# Patient Record
Sex: FEMALE | Race: WHITE | Employment: PART TIME | URBAN - METROPOLITAN AREA
[De-identification: names, ages, dates, MRNs, and addresses within clinical notes are randomized per-mention and may not be internally consistent; named-entity substitution may affect disease eponyms.]

---

## 2017-03-29 DIAGNOSIS — F32.81 PMDD (PREMENSTRUAL DYSPHORIC DISORDER): ICD-10-CM

## 2017-03-29 DIAGNOSIS — F41.9 ANXIETY: ICD-10-CM

## 2017-03-29 DIAGNOSIS — Z51.81 ENCOUNTER FOR THERAPEUTIC DRUG MONITORING: Primary | ICD-10-CM

## 2017-03-29 RX ORDER — FLUOXETINE HYDROCHLORIDE 40 MG/1
CAPSULE ORAL
Qty: 90 CAPSULE | Refills: 0 | Status: SHIPPED | OUTPATIENT
Start: 2017-03-29 | End: 2017-12-20

## 2017-03-29 NOTE — TELEPHONE ENCOUNTER
Patient is requesting a refill on Fluoxetine 40mg # 90  LOV- 7/28/16  Last Refill- 12/31/16  Needs appointment to address anxiety

## 2017-06-27 DIAGNOSIS — Z51.81 ENCOUNTER FOR THERAPEUTIC DRUG MONITORING: ICD-10-CM

## 2017-06-27 DIAGNOSIS — F41.9 ANXIETY: ICD-10-CM

## 2017-06-27 DIAGNOSIS — F32.81 PMDD (PREMENSTRUAL DYSPHORIC DISORDER): ICD-10-CM

## 2017-06-27 NOTE — TELEPHONE ENCOUNTER
Requesting FLUOXETINE HCL CAPS 40MG    LOV: 11/18/2016  Filled: 3.29.2017 #90 with 0 refills    No future appointments. Please call pt and schedule a medication refill OV. Medication placed in pending until appt is scheduled.

## 2017-06-29 RX ORDER — FLUOXETINE HYDROCHLORIDE 40 MG/1
CAPSULE ORAL
Qty: 90 CAPSULE | OUTPATIENT
Start: 2017-06-29

## 2017-06-30 ENCOUNTER — TELEPHONE (OUTPATIENT)
Dept: FAMILY MEDICINE CLINIC | Facility: CLINIC | Age: 47
End: 2017-06-30

## 2017-06-30 DIAGNOSIS — E78.2 ELEVATED TRIGLYCERIDES WITH HIGH CHOLESTEROL: ICD-10-CM

## 2017-06-30 DIAGNOSIS — Z13.29 SCREENING FOR THYROID DISORDER: ICD-10-CM

## 2017-06-30 DIAGNOSIS — I10 ESSENTIAL HYPERTENSION: Primary | ICD-10-CM

## 2017-06-30 NOTE — TELEPHONE ENCOUNTER
Spoke to pt and made her aware that orders are in the system for [facility] and they are fasting.  Pt made follow up for labs and meds

## 2017-07-14 LAB
ABSOLUTE BASOPHILS: 12 CELLS/UL (ref 0–200)
ABSOLUTE EOSINOPHILS: 120 CELLS/UL (ref 15–500)
ABSOLUTE LYMPHOCYTES: 1074 CELLS/UL (ref 850–3900)
ABSOLUTE MONOCYTES: 450 CELLS/UL (ref 200–950)
ABSOLUTE NEUTROPHILS: 4344 CELLS/UL (ref 1500–7800)
ALBUMIN/GLOBULIN RATIO: 1.4 (CALC) (ref 1–2.5)
ALBUMIN: 4.1 G/DL (ref 3.6–5.1)
ALKALINE PHOSPHATASE: 54 U/L (ref 33–115)
ALT: 16 U/L (ref 6–29)
AST: 21 U/L (ref 10–35)
BASOPHILS: 0.2 %
BILIRUBIN, TOTAL: 0.6 MG/DL (ref 0.2–1.2)
BUN: 13 MG/DL (ref 7–25)
CALCIUM: 9.2 MG/DL (ref 8.6–10.2)
CARBON DIOXIDE: 25 MMOL/L (ref 20–31)
CHLORIDE: 104 MMOL/L (ref 98–110)
CHOLESTEROL, TOTAL: 180 MG/DL (ref 125–200)
CREATININE: 0.9 MG/DL (ref 0.5–1.1)
EGFR IF AFRICN AM: 89 ML/MIN/1.73M2
EGFR IF NONAFRICN AM: 77 ML/MIN/1.73M2
EOSINOPHILS: 2 %
GLOBULIN: 2.9 G/DL (CALC) (ref 1.9–3.7)
GLUCOSE: 95 MG/DL (ref 65–99)
HEMATOCRIT: 38.9 % (ref 35–45)
HEMOGLOBIN: 12.9 G/DL (ref 11.7–15.5)
LYMPHOCYTES: 17.9 %
MCH: 28.9 PG (ref 27–33)
MCHC: 33.2 G/DL (ref 32–36)
MCV: 87.2 FL (ref 80–100)
MONOCYTES: 7.5 %
MPV: 9.7 FL (ref 7.5–12.5)
NEUTROPHILS: 72.4 %
PLATELET COUNT: 268 THOUSAND/UL (ref 140–400)
POTASSIUM: 4.1 MMOL/L (ref 3.5–5.3)
PROTEIN, TOTAL: 7 G/DL (ref 6.1–8.1)
RDW: 12.5 % (ref 11–15)
RED BLOOD CELL COUNT: 4.46 MILLION/UL (ref 3.8–5.1)
SODIUM: 140 MMOL/L (ref 135–146)
TSH W/REFLEX TO FT4: 1.9 MIU/L
WHITE BLOOD CELL COUNT: 6 THOUSAND/UL (ref 3.8–10.8)

## 2017-07-18 ENCOUNTER — OFFICE VISIT (OUTPATIENT)
Dept: FAMILY MEDICINE CLINIC | Facility: CLINIC | Age: 47
End: 2017-07-18

## 2017-07-18 VITALS
BODY MASS INDEX: 31.24 KG/M2 | WEIGHT: 183 LBS | SYSTOLIC BLOOD PRESSURE: 122 MMHG | HEART RATE: 80 BPM | HEIGHT: 64 IN | RESPIRATION RATE: 16 BRPM | DIASTOLIC BLOOD PRESSURE: 80 MMHG | TEMPERATURE: 99 F

## 2017-07-18 DIAGNOSIS — Z51.81 ENCOUNTER FOR THERAPEUTIC DRUG MONITORING: ICD-10-CM

## 2017-07-18 DIAGNOSIS — F32.81 PMDD (PREMENSTRUAL DYSPHORIC DISORDER): ICD-10-CM

## 2017-07-18 DIAGNOSIS — F41.9 ANXIETY: ICD-10-CM

## 2017-07-18 DIAGNOSIS — E04.2 MULTIPLE THYROID NODULES: ICD-10-CM

## 2017-07-18 DIAGNOSIS — E78.2 MIXED DYSLIPIDEMIA: Primary | ICD-10-CM

## 2017-07-18 DIAGNOSIS — I10 ESSENTIAL HYPERTENSION: ICD-10-CM

## 2017-07-18 PROCEDURE — 99214 OFFICE O/P EST MOD 30 MIN: CPT | Performed by: FAMILY MEDICINE

## 2017-07-18 RX ORDER — ATORVASTATIN CALCIUM 10 MG/1
10 TABLET, FILM COATED ORAL
Qty: 90 TABLET | Refills: 3 | Status: SHIPPED | OUTPATIENT
Start: 2017-07-18 | End: 2019-01-10

## 2017-07-18 RX ORDER — FLUOXETINE HYDROCHLORIDE 40 MG/1
40 CAPSULE ORAL
Qty: 90 CAPSULE | Refills: 0 | Status: CANCELLED | OUTPATIENT
Start: 2017-07-18

## 2017-07-18 NOTE — PATIENT INSTRUCTIONS
Facts About Dietary Fat     Olive oil is a good source of unsaturated fat. Eating less saturated and trans fat is one of the best things you can do for your heart. Start by finding out which fats are better to use.  Then always try to use as little \" © 6454-9700 [address], [address] [place]. All rights reserved. This information is not intended as a substitute for professional medical care. Always follow your healthcare professional's instructions.         Exercis Exercise on most days. Aim for a total of 150 or more minutes of moderate to  vigorous intensity activity each week. Consider 40 minutes, 3 to 4 times a week. For best results, activity should last for 40 minutes on average.  It is OK to work up to the 36 m

## 2017-07-18 NOTE — PROGRESS NOTES
Ca Mosquera is a 41year old female. HPI:   Pt had labs done on 7/13/17 for thyroid levels, total cholesterol panel, CMP, CBC that were completely normal and reviewed face to face. She needs her Atorvastatin refilled today for lipid management.    She Quit date: 3/1/2002  Smokeless tobacco: Never Used                      Comment: quit 2002-5 per day  Alcohol use: Yes           0.0 oz/week     Comment: occas       REVIEW OF SYSTEMS:   GENERAL HEALTH: feels well otherwise today and here for lab reviewnahed Hypertension addressed and stable. Thyroid US ordered for surveillance as well. She agrees to discussion and plans. Diagnoses and all orders for this visit:    Mixed dyslipidemia  -     atorvastatin 10 MG Oral Tab;  Take 1 tablet (10 mg total) by m

## 2017-11-02 ENCOUNTER — MED REC SCAN ONLY (OUTPATIENT)
Dept: FAMILY MEDICINE CLINIC | Facility: CLINIC | Age: 47
End: 2017-11-02

## 2017-11-20 ENCOUNTER — TELEPHONE (OUTPATIENT)
Dept: FAMILY MEDICINE CLINIC | Facility: CLINIC | Age: 47
End: 2017-11-20

## 2017-11-20 DIAGNOSIS — E04.1 LEFT THYROID NODULE: Primary | ICD-10-CM

## 2017-11-21 NOTE — TELEPHONE ENCOUNTER
Call pt--she completed her thyroid US on 11/2/17 which was ordered in July at [facility]. The report states that she has a partially solid nodule in the mid left lobe of the thyroid that has increased in size to 1.3 cm.  Any nodule over 1 cm requires an ENT cons

## 2017-11-21 NOTE — TELEPHONE ENCOUNTER
Normal mammogram report received and reviewed from [facility] from 11/2/17.  Dr. Christo Long

## 2017-11-30 PROBLEM — E04.1 THYROID NODULE: Status: ACTIVE | Noted: 2017-11-30

## 2017-12-05 ENCOUNTER — HOSPITAL ENCOUNTER (OUTPATIENT)
Dept: ULTRASOUND IMAGING | Facility: HOSPITAL | Age: 47
Discharge: HOME | End: 2017-12-05
Attending: OTOLARYNGOLOGY
Payer: COMMERCIAL

## 2017-12-05 DIAGNOSIS — E04.1 THYROID NODULE: ICD-10-CM

## 2017-12-05 PROCEDURE — 10022 US FNA THYROID (CPT=10022/76942): CPT | Performed by: OTOLARYNGOLOGY

## 2017-12-05 PROCEDURE — 76942 ECHO GUIDE FOR BIOPSY: CPT | Performed by: OTOLARYNGOLOGY

## 2017-12-05 PROCEDURE — 88173 CYTOPATH EVAL FNA REPORT: CPT | Performed by: OTOLARYNGOLOGY

## 2017-12-18 ENCOUNTER — HOSPITAL ENCOUNTER (OUTPATIENT)
Dept: ULTRASOUND IMAGING | Facility: HOSPITAL | Age: 47
Discharge: HOME | End: 2017-12-18
Attending: OTOLARYNGOLOGY
Payer: COMMERCIAL

## 2017-12-18 DIAGNOSIS — E04.1 THYROID NODULE: ICD-10-CM

## 2017-12-18 PROCEDURE — 88173 CYTOPATH EVAL FNA REPORT: CPT | Performed by: OTOLARYNGOLOGY

## 2017-12-18 PROCEDURE — 88172 CYTP DX EVAL FNA 1ST EA SITE: CPT | Performed by: OTOLARYNGOLOGY

## 2017-12-18 PROCEDURE — 76942 ECHO GUIDE FOR BIOPSY: CPT | Performed by: OTOLARYNGOLOGY

## 2017-12-18 PROCEDURE — 10022 US FNA THYROID (CPT=10022/76942): CPT | Performed by: OTOLARYNGOLOGY

## 2017-12-18 NOTE — PROCEDURES
PROCEDURE: US FNA THYROID (CPT=10022/94016)    COMPARISON: External Exams, US THYROID (GBI=05021), 11/02/2017, 7:01. ARTURO , US FNA THYROID (CPT=10022/47025), 12/05/2017, 8:38.     INDICATIONS: E04.1 Nontoxic single thyroid nodule    DESCRIPTION: [facility] Received call from RN that pt is requesting her med, nexium.    Pt is currently not able to tolerate PO.    Ordered 40mg IV Zantac daily.    Discussed w/Dr. Wilson

## 2017-12-19 PROBLEM — Z98.51 H/O TUBAL LIGATION: Status: ACTIVE | Noted: 2017-12-19

## 2017-12-19 PROCEDURE — 88175 CYTOPATH C/V AUTO FLUID REDO: CPT | Performed by: OBSTETRICS & GYNECOLOGY

## 2017-12-20 ENCOUNTER — TELEPHONE (OUTPATIENT)
Dept: FAMILY MEDICINE CLINIC | Facility: CLINIC | Age: 47
End: 2017-12-20

## 2017-12-20 DIAGNOSIS — Z51.81 ENCOUNTER FOR THERAPEUTIC DRUG MONITORING: ICD-10-CM

## 2017-12-20 DIAGNOSIS — F32.81 PMDD (PREMENSTRUAL DYSPHORIC DISORDER): ICD-10-CM

## 2017-12-20 DIAGNOSIS — F41.9 ANXIETY: ICD-10-CM

## 2017-12-20 RX ORDER — FLUOXETINE HYDROCHLORIDE 40 MG/1
40 CAPSULE ORAL
Qty: 30 CAPSULE | Refills: 0 | Status: SHIPPED | OUTPATIENT
Start: 2017-12-20 | End: 2018-01-08

## 2017-12-20 NOTE — TELEPHONE ENCOUNTER
Pt req refill, pt sts she contacted [facility] and was told that Dr Zahraa Mancilla has cancelled her rx. Pt wants to speak with nurse. Name of Medication: FLUOXETINE HCL 40 MG Oral Cap    Dose:     How is medication prescribed:TAKE 1 CAPSULE DAILY    Sp

## 2017-12-20 NOTE — TELEPHONE ENCOUNTER
Requesting Fluoxetine  LOV: 7/18/17  RTC: 4 months  Last Relevant Labs: na  Filled: 3/21/17 #90 with 0 refills    No future appointments. I spoke with patient and let her know there was no denial of medication - it was not requested.   I further advised

## 2018-01-08 PROBLEM — Z98.51 H/O TUBAL LIGATION: Status: RESOLVED | Noted: 2017-12-19 | Resolved: 2018-01-08

## 2018-01-08 NOTE — PROGRESS NOTES
CHIEF COMPLAINT:   Per nursing:   Patient presents with:  Medication Follow-Up: follow up on fluoxetine  Depression: with anxiety  Imm/Inj: pt signed consent for flu shot      HPI:   Renu Waller is a 41year old female who was scheduled for a CPE but sh mouth. Disp:  Rfl:    DAILY MULTIVITAMIN OR 1 tablet daily Disp:  Rfl:       Past Medical History:   Diagnosis Date   • Abdominal pain    • Abnormal ECG    • Anxiety     h/o panic attacks   • Anxiety state, unspecified    • Dermatographic urticaria 6/13/20 or lightheadedness  ENDO: has had FNAs of thyroid nodule and benign  GI: gained ten pounds this year   PSYCHE: chronic anxiety and depression and on Fluoxetine and needs refill     EXAM:   /78   Pulse 69   Temp 97.9 °F (36.6 °C) (Oral)   Resp 12   Ht daily.    Essential hypertension  Controlled off meds currently     Elevated triglycerides with high cholesterol  Pt to remain on Atorvastatin and labs annually     Thyroid nodule  Per ENT and FNA negative recently done     Needs flu shot  -     FLULAVAL I

## 2018-01-08 NOTE — PATIENT INSTRUCTIONS
Your Body’s Response to Anxiety    Normal anxiety is part of the body’s natural defense system.  It's an alert to a threat that is unknown, vague, or comes from your own internal fears. While you’re in this state, your feelings can range from a vague sens Some people are more prone to persistent anxiety than others. It tends to run in families, and it affects more younger people than older people, and more women than men. But no age, race, or gender is immune to anxiety problems.   Anxiety can be treated  Th © 9467-2910 [address]. [address] [facility], [facility] [place]. All rights reserved. This information is not intended as a substitute for professional medical care. Always follow your healthcare professional's instructions.         Taking · Place your arm on the table, palm up. Your arm should be at the level of your heart. Wrap the cuff around your upper arm, just above your elbow. It’s best done on bare skin, not over clothing.  Most cuffs will indicate where the brachial artery (the blood The flu (influenza) is caused by a virus that is easily spread. A flu vaccine is your best chance to avoid the flu. The vaccine is given in the form of a shot (injection) or a nasal spray.  It’s best to get vaccinated each year when the flu vaccine is avail The CDC recommends that infants over the age of 7 months and all children and adults should get a flu shot every year. Some people are at an increased risk of developing serious complications from the flu.  It's extremely important that these people get th © 0478-5220 [address]. [address] [facility]. All rights reserved. This information is not intended as a substitute for professional medical care. Always follow your healthcare professional's instructions.         Flu Vac There are many types (strains) of the flu virus. Medical experts predict which strains are most likely to make people sick each year. Flu shots are made from these strains.  When you get a flu vaccine, killed (inactivated) viruses are injected into your bod The flu shot is available in a few different forms. Your healthcare provider will determine which vaccine is right for you. There is a high-dose vaccine for those over age 72 and a vaccine for those with egg allergies. It is safe for most people.  Talk with

## 2018-10-01 ENCOUNTER — MED REC SCAN ONLY (OUTPATIENT)
Dept: FAMILY MEDICINE CLINIC | Facility: CLINIC | Age: 48
End: 2018-10-01

## 2018-10-09 ENCOUNTER — OFFICE VISIT (OUTPATIENT)
Dept: FAMILY MEDICINE CLINIC | Facility: CLINIC | Age: 48
End: 2018-10-09
Payer: COMMERCIAL

## 2018-10-09 VITALS
HEIGHT: 64 IN | HEART RATE: 74 BPM | DIASTOLIC BLOOD PRESSURE: 80 MMHG | RESPIRATION RATE: 16 BRPM | TEMPERATURE: 98 F | WEIGHT: 188 LBS | BODY MASS INDEX: 32.1 KG/M2 | SYSTOLIC BLOOD PRESSURE: 118 MMHG

## 2018-10-09 DIAGNOSIS — F32.81 PMDD (PREMENSTRUAL DYSPHORIC DISORDER): ICD-10-CM

## 2018-10-09 DIAGNOSIS — E04.1 THYROID NODULE: ICD-10-CM

## 2018-10-09 DIAGNOSIS — Z00.00 ROUTINE MEDICAL EXAM: Primary | ICD-10-CM

## 2018-10-09 DIAGNOSIS — Z00.00 LABORATORY EXAM ORDERED AS PART OF ROUTINE GENERAL MEDICAL EXAMINATION: ICD-10-CM

## 2018-10-09 DIAGNOSIS — E66.9 OBESITY (BMI 30.0-34.9): ICD-10-CM

## 2018-10-09 DIAGNOSIS — Z12.39 SCREENING FOR BREAST CANCER: ICD-10-CM

## 2018-10-09 DIAGNOSIS — Z23 NEED FOR VACCINATION: ICD-10-CM

## 2018-10-09 PROCEDURE — 90471 IMMUNIZATION ADMIN: CPT | Performed by: FAMILY MEDICINE

## 2018-10-09 PROCEDURE — 99213 OFFICE O/P EST LOW 20 MIN: CPT | Performed by: FAMILY MEDICINE

## 2018-10-09 PROCEDURE — 90686 IIV4 VACC NO PRSV 0.5 ML IM: CPT | Performed by: FAMILY MEDICINE

## 2018-10-09 PROCEDURE — 99396 PREV VISIT EST AGE 40-64: CPT | Performed by: FAMILY MEDICINE

## 2018-10-09 RX ORDER — PHENTERMINE HYDROCHLORIDE 30 MG/1
30 CAPSULE ORAL EVERY MORNING
Qty: 30 CAPSULE | Refills: 0 | Status: SHIPPED | OUTPATIENT
Start: 2018-10-09 | End: 2018-11-09

## 2018-10-09 NOTE — PROGRESS NOTES
Patient presents with:  Physical: Former Dr. Anabel Baker patient. Imm/Inj: Request flu vaccine today. Thyroid Problem: Due for annual thyroid ultrasound, states she gets the test done at [facility] in [facility].       HPI:  John Park is a 57year old female h Hemorrhoids    • Hyperlipidemia    • Multiple thyroid nodules    • PMDD (premenstrual dysphoric disorder)      Past Surgical History:   Procedure Laterality Date   •   , 2009    x2   • TUBAL LIGATION Bilateral 2009   • US FNA THYROID (CP Other Topics      Concerns:         Service: Not Asked        Blood Transfusions: Not Asked        Caffeine Concern: No        Occupational Exposure: Not Asked        Hobby Hazards: Not Asked        Sleep Concern: Not Asked        Stress Concern: N rhythm. No murmurs, gallops, or rubs. ABDOMEN:  + bowel sounds, soft, nontender, nondistended. No hepatomegaly. MUSCULOSKELETAL: Strength of upper and lower extremities 5/5 bilaterally. Normal gait. NEUROLOGIC:  Cranial nerves 2-12 grossly intact.   [address] [place] [address]

## 2018-11-08 NOTE — PROGRESS NOTES
[facility] Group Visit Note  11/8/2018      Subjective:      Patient ID: Priscilla Thompson is a 57year old female. Chief Complaint:  Patient presents with:  Weight Check: Here for 1 month f/u on life style changes. Down 5 lbs.       HPI:  Priscilla Thompson normal respiratory effort  Abd:  +bowel sounds, soft  Ext:  No pedal edema,  Pedal pulses 2+ B        Assessment:     1. Obesity (BMI 30.0-34.9)  - Phentermine HCl 30 MG Oral Cap; Take 1 capsule (30 mg total) by mouth every morning.   Dispense: 30 capsule;

## 2018-11-09 ENCOUNTER — OFFICE VISIT (OUTPATIENT)
Dept: FAMILY MEDICINE CLINIC | Facility: CLINIC | Age: 48
End: 2018-11-09
Payer: COMMERCIAL

## 2018-11-09 VITALS
RESPIRATION RATE: 16 BRPM | WEIGHT: 183 LBS | HEIGHT: 64 IN | HEART RATE: 98 BPM | SYSTOLIC BLOOD PRESSURE: 118 MMHG | BODY MASS INDEX: 31.24 KG/M2 | DIASTOLIC BLOOD PRESSURE: 80 MMHG | TEMPERATURE: 98 F

## 2018-11-09 DIAGNOSIS — E66.9 OBESITY (BMI 30.0-34.9): ICD-10-CM

## 2018-11-09 PROCEDURE — 99213 OFFICE O/P EST LOW 20 MIN: CPT | Performed by: FAMILY MEDICINE

## 2018-11-09 RX ORDER — PHENTERMINE HYDROCHLORIDE 30 MG/1
30 CAPSULE ORAL EVERY MORNING
Qty: 30 CAPSULE | Refills: 0 | Status: SHIPPED | OUTPATIENT
Start: 2018-11-09 | End: 2018-12-10

## 2018-12-10 ENCOUNTER — OFFICE VISIT (OUTPATIENT)
Dept: FAMILY MEDICINE CLINIC | Facility: CLINIC | Age: 48
End: 2018-12-10
Payer: COMMERCIAL

## 2018-12-10 VITALS
WEIGHT: 181 LBS | DIASTOLIC BLOOD PRESSURE: 80 MMHG | HEIGHT: 64 IN | RESPIRATION RATE: 16 BRPM | HEART RATE: 88 BPM | SYSTOLIC BLOOD PRESSURE: 118 MMHG | TEMPERATURE: 98 F | BODY MASS INDEX: 30.9 KG/M2

## 2018-12-10 DIAGNOSIS — E66.9 OBESITY (BMI 30.0-34.9): ICD-10-CM

## 2018-12-10 PROCEDURE — 99213 OFFICE O/P EST LOW 20 MIN: CPT | Performed by: FAMILY MEDICINE

## 2018-12-10 RX ORDER — PHENTERMINE HYDROCHLORIDE 30 MG/1
30 CAPSULE ORAL EVERY MORNING
Qty: 30 CAPSULE | Refills: 0 | Status: SHIPPED | OUTPATIENT
Start: 2018-12-10 | End: 2019-01-10

## 2018-12-10 NOTE — PROGRESS NOTES
[facility] Group Visit Note  12/10/2018      Subjective:      Patient ID: Ruslan Vazquez is a 57year old female. Chief Complaint:  Patient presents with:  Weight Check: Here for 1 month f/u on life style changes. Down 2 lbs in the last month.   Phen mouth every morning. Dispense: 30 capsule; Refill: 0        Return in about 1 month (around 1/10/2019) for lifestyle modifications.

## 2019-01-01 ENCOUNTER — EXTERNAL RECORD (OUTPATIENT)
Dept: HEALTH INFORMATION MANAGEMENT | Facility: OTHER | Age: 49
End: 2019-01-01

## 2019-01-10 ENCOUNTER — OFFICE VISIT (OUTPATIENT)
Dept: FAMILY MEDICINE CLINIC | Facility: CLINIC | Age: 49
End: 2019-01-10
Payer: COMMERCIAL

## 2019-01-10 VITALS
TEMPERATURE: 98 F | RESPIRATION RATE: 16 BRPM | WEIGHT: 182 LBS | DIASTOLIC BLOOD PRESSURE: 70 MMHG | SYSTOLIC BLOOD PRESSURE: 112 MMHG | HEART RATE: 78 BPM | HEIGHT: 64 IN | BODY MASS INDEX: 31.07 KG/M2

## 2019-01-10 DIAGNOSIS — L30.8 OTHER ECZEMA: Primary | ICD-10-CM

## 2019-01-10 DIAGNOSIS — Z51.81 ENCOUNTER FOR THERAPEUTIC DRUG MONITORING: ICD-10-CM

## 2019-01-10 DIAGNOSIS — E66.9 OBESITY (BMI 30.0-34.9): ICD-10-CM

## 2019-01-10 DIAGNOSIS — E78.2 MIXED DYSLIPIDEMIA: ICD-10-CM

## 2019-01-10 PROCEDURE — 99214 OFFICE O/P EST MOD 30 MIN: CPT | Performed by: FAMILY MEDICINE

## 2019-01-10 RX ORDER — PHENTERMINE HYDROCHLORIDE 30 MG/1
30 CAPSULE ORAL EVERY MORNING
Qty: 30 CAPSULE | Refills: 0 | Status: SHIPPED | OUTPATIENT
Start: 2019-01-10 | End: 2019-02-07 | Stop reason: DRUGHIGH

## 2019-01-10 RX ORDER — ATORVASTATIN CALCIUM 10 MG/1
10 TABLET, FILM COATED ORAL
Qty: 90 TABLET | Refills: 3 | Status: SHIPPED | OUTPATIENT
Start: 2019-01-10 | End: 2019-02-07 | Stop reason: ALTCHOICE

## 2019-01-10 RX ORDER — BETAMETHASONE DIPROPIONATE 0.5 MG/G
1 CREAM TOPICAL 2 TIMES DAILY PRN
Qty: 50 G | Refills: 2 | Status: SHIPPED | OUTPATIENT
Start: 2019-01-10 | End: 2020-10-20

## 2019-01-10 RX ORDER — ATORVASTATIN CALCIUM 10 MG/1
10 TABLET, FILM COATED ORAL NIGHTLY
Qty: 30 TABLET | Refills: 0 | Status: SHIPPED | OUTPATIENT
Start: 2019-01-10 | End: 2020-01-07

## 2019-02-06 NOTE — PROGRESS NOTES
[facility] Group Visit Note  2/6/2019      Subjective:      Patient ID: Garrett Valente is a 57year old female. Chief Complaint:  Patient presents with:  Weight Check: 1 month f/u on life style changes. Weight stayed the same.  Phentermine scripts: 1 respiratory effort  Abd:  +bowel sounds, soft  Ext:  No pedal edema,  Pedal pulses 2+ B        Assessment:     1. Class 1 obesity due to excess calories without serious comorbidity with body mass index (BMI) of 31.0 to 31.9 in adult  - Phentermine HCl 37. 5

## 2019-02-07 ENCOUNTER — OFFICE VISIT (OUTPATIENT)
Dept: FAMILY MEDICINE CLINIC | Facility: CLINIC | Age: 49
End: 2019-02-07
Payer: COMMERCIAL

## 2019-02-07 VITALS
HEART RATE: 88 BPM | TEMPERATURE: 98 F | WEIGHT: 182 LBS | BODY MASS INDEX: 31.07 KG/M2 | DIASTOLIC BLOOD PRESSURE: 70 MMHG | SYSTOLIC BLOOD PRESSURE: 110 MMHG | RESPIRATION RATE: 16 BRPM | HEIGHT: 64 IN

## 2019-02-07 DIAGNOSIS — E66.09 CLASS 1 OBESITY DUE TO EXCESS CALORIES WITHOUT SERIOUS COMORBIDITY WITH BODY MASS INDEX (BMI) OF 31.0 TO 31.9 IN ADULT: Primary | ICD-10-CM

## 2019-02-07 PROCEDURE — 99213 OFFICE O/P EST LOW 20 MIN: CPT | Performed by: FAMILY MEDICINE

## 2019-02-07 RX ORDER — PHENTERMINE HYDROCHLORIDE 37.5 MG/1
37.5 CAPSULE ORAL EVERY MORNING
Qty: 30 CAPSULE | Refills: 0 | Status: SHIPPED | OUTPATIENT
Start: 2019-02-07 | End: 2019-03-14

## 2019-02-07 RX ORDER — PHENTERMINE HYDROCHLORIDE 30 MG/1
30 CAPSULE ORAL EVERY MORNING
Qty: 30 CAPSULE | Refills: 0 | Status: CANCELLED | OUTPATIENT
Start: 2019-02-09

## 2019-03-13 NOTE — PROGRESS NOTES
[facility] Visit Note  3/13/2019      Subjective:      Patient ID: Syed Chaparro is a 57year old female. Chief Complaint:  Patient presents with:  Weight Check:  f/u on life style changes. Weight stayed the same.  Phentermine scripts: 1st- 10 membranes moist   Neck:  No cervical lymphadenopathy  CV: Regular rate and rhythm. No murmurs, gallops, or rubs.   Lungs:  Clear to auscultation B, no wheezes, rales, or rhonchi, normal respiratory effort  Abd:  +bowel sounds, soft  Ext:  No pedal edema,  P

## 2019-04-15 DIAGNOSIS — F32.81 PMDD (PREMENSTRUAL DYSPHORIC DISORDER): ICD-10-CM

## 2019-04-15 DIAGNOSIS — Z51.81 ENCOUNTER FOR THERAPEUTIC DRUG MONITORING: ICD-10-CM

## 2019-04-15 DIAGNOSIS — F41.9 ANXIETY: ICD-10-CM

## 2019-04-15 RX ORDER — FLUOXETINE HYDROCHLORIDE 40 MG/1
40 CAPSULE ORAL
Qty: 90 CAPSULE | Refills: 3 | Status: SHIPPED | OUTPATIENT
Start: 2019-04-15 | End: 2020-04-09

## 2019-04-15 NOTE — TELEPHONE ENCOUNTER
[facility]  Requesting: Fluoxetine HCL 40 mg Caps  LOV: 3/14/19  RTC: Nov for annual px  Last Relevant Labs: N/A  Filled: 1/18/18 #90 with 3 refills    No future appointments.       Non protocol med pended & routed to P

## 2019-06-03 ENCOUNTER — APPOINTMENT (OUTPATIENT)
Dept: GENERAL RADIOLOGY | Age: 49
End: 2019-06-03
Attending: NURSE PRACTITIONER
Payer: COMMERCIAL

## 2019-06-03 ENCOUNTER — HOSPITAL ENCOUNTER (EMERGENCY)
Age: 49
Discharge: HOME | End: 2019-06-03
Attending: EMERGENCY MEDICINE
Payer: COMMERCIAL

## 2019-06-03 VITALS
HEART RATE: 84 BPM | WEIGHT: 180 LBS | SYSTOLIC BLOOD PRESSURE: 128 MMHG | DIASTOLIC BLOOD PRESSURE: 87 MMHG | TEMPERATURE: 98 F | HEIGHT: 64 IN | OXYGEN SATURATION: 98 % | RESPIRATION RATE: 15 BRPM | BODY MASS INDEX: 30.73 KG/M2

## 2019-06-03 DIAGNOSIS — R00.2 PALPITATIONS: Primary | ICD-10-CM

## 2019-06-03 DIAGNOSIS — E87.6 HYPOKALEMIA: ICD-10-CM

## 2019-06-03 PROCEDURE — 71045 X-RAY EXAM CHEST 1 VIEW: CPT | Performed by: NURSE PRACTITIONER

## 2019-06-03 PROCEDURE — 83735 ASSAY OF MAGNESIUM: CPT | Performed by: EMERGENCY MEDICINE

## 2019-06-03 PROCEDURE — 85025 COMPLETE CBC W/AUTO DIFF WBC: CPT | Performed by: NURSE PRACTITIONER

## 2019-06-03 PROCEDURE — 84439 ASSAY OF FREE THYROXINE: CPT | Performed by: EMERGENCY MEDICINE

## 2019-06-03 PROCEDURE — 84443 ASSAY THYROID STIM HORMONE: CPT | Performed by: EMERGENCY MEDICINE

## 2019-06-03 PROCEDURE — 93005 ELECTROCARDIOGRAM TRACING: CPT

## 2019-06-03 PROCEDURE — 84484 ASSAY OF TROPONIN QUANT: CPT | Performed by: NURSE PRACTITIONER

## 2019-06-03 PROCEDURE — 80053 COMPREHEN METABOLIC PANEL: CPT | Performed by: NURSE PRACTITIONER

## 2019-06-03 PROCEDURE — 93010 ELECTROCARDIOGRAM REPORT: CPT

## 2019-06-03 PROCEDURE — 36415 COLL VENOUS BLD VENIPUNCTURE: CPT

## 2019-06-03 PROCEDURE — 99285 EMERGENCY DEPT VISIT HI MDM: CPT

## 2019-06-03 PROCEDURE — 99284 EMERGENCY DEPT VISIT MOD MDM: CPT

## 2019-06-03 RX ORDER — POTASSIUM CHLORIDE 20 MEQ/1
40 TABLET, EXTENDED RELEASE ORAL ONCE
Status: COMPLETED | OUTPATIENT
Start: 2019-06-03 | End: 2019-06-03

## 2019-06-03 NOTE — ED PROVIDER NOTES
Patient Seen in: [facility] In [place]    History   Patient presents with:  Chest Pain Angina (cardiovascular)    Stated Complaint: chest pain    29-year-old female presents today with a history of palpitations, shortness of breath an noted in HPI. Constitutional and vital signs reviewed. All other systems reviewed and negative except as noted above.     Physical Exam     ED Triage Vitals [06/03/19 1229]   BP (!) 160/95   Pulse 92   Resp 13   Temp 97.8 °F (36.6 °C)   Temp src Oral Report. Rate: 89  Rhythm: Sinus Rhythm  Reading: Normal sinus rhythm. No ischemic changes. . QRS 70. QT\QTc [id number]. Dr. Merlinda Mew did review EKG.              Xr Chest Ap Portable  (cpt=71045)    Result Date: 6/3/2019  PROCEDURE:  XR CHEST AP PORT

## 2019-06-04 ENCOUNTER — TELEPHONE (OUTPATIENT)
Dept: FAMILY MEDICINE CLINIC | Facility: CLINIC | Age: 49
End: 2019-06-04

## 2019-06-05 ENCOUNTER — OFFICE VISIT (OUTPATIENT)
Dept: FAMILY MEDICINE CLINIC | Facility: CLINIC | Age: 49
End: 2019-06-05
Payer: COMMERCIAL

## 2019-06-05 VITALS
DIASTOLIC BLOOD PRESSURE: 78 MMHG | BODY MASS INDEX: 31.58 KG/M2 | RESPIRATION RATE: 16 BRPM | HEART RATE: 80 BPM | WEIGHT: 185 LBS | HEIGHT: 64 IN | SYSTOLIC BLOOD PRESSURE: 122 MMHG | TEMPERATURE: 98 F

## 2019-06-05 DIAGNOSIS — R00.2 PALPITATIONS: Primary | ICD-10-CM

## 2019-06-05 DIAGNOSIS — E87.6 HYPOKALEMIA: ICD-10-CM

## 2019-06-05 PROCEDURE — 99214 OFFICE O/P EST MOD 30 MIN: CPT | Performed by: PHYSICIAN ASSISTANT

## 2019-06-05 RX ORDER — ALPRAZOLAM 0.25 MG/1
0.25 TABLET ORAL 2 TIMES DAILY PRN
Qty: 30 TABLET | Refills: 0 | Status: SHIPPED | OUTPATIENT
Start: 2019-06-05 | End: 2019-10-03

## 2019-06-05 NOTE — PATIENT INSTRUCTIONS
Thank you for choosing Jaymie Schulz PA-C at [facility]  To Do: [place]  1. Pt to get lab work done  2. Begin medication as prescribed  3.  Follow-up in 1-2 months, sooner if problems    • Please signup for MY CHART, which is electronic company approved your testing, please call Central Scheduling at [phone]  Please allow our office 5 business days to contact you regarding any testing results.     Refill policies:   Allow 3 business days for refills; controlled substances may take tristan

## 2019-06-05 NOTE — PROGRESS NOTES
[facility] Group Internal Medicine Progress Note    CC:  Patient presents with:  ER F/U: treated at [facility] 6/3/19  for palpitations. Wants to discuss the lab results that were done at that visit.  Today states she's feeling fine, denies having any sy by mouth. Disp:  Rfl:    DAILY MULTIVITAMIN OR 1 tablet daily Disp:  Rfl:      No current facility-administered medications on file prior to visit. Review of Systems :  Review of Systems   Constitutional: Negative for chills and fever.    Respiratory: N of medication    Hypokalemia  Pt has been increasing potassium in diet  Will recheck BMP    RTC in 2 months, sooner if problems    Orders Placed This Encounter      Basic Metabolic Panel (8) [E]      Thyroid peroxidase & thyroglobulin ab [E]      TSH [899]

## 2019-06-20 ENCOUNTER — TELEPHONE (OUTPATIENT)
Dept: CARDIOLOGY | Age: 49
End: 2019-06-20

## 2019-06-20 ENCOUNTER — OFFICE VISIT (OUTPATIENT)
Dept: CARDIOLOGY | Age: 49
End: 2019-06-20

## 2019-06-20 ENCOUNTER — RESEARCH ENCOUNTER (OUTPATIENT)
Dept: CARDIOLOGY | Age: 49
End: 2019-06-20

## 2019-06-20 ENCOUNTER — ANCILLARY PROCEDURE (OUTPATIENT)
Dept: CARDIOLOGY | Age: 49
End: 2019-06-20
Attending: INTERNAL MEDICINE

## 2019-06-20 VITALS
BODY MASS INDEX: 32.1 KG/M2 | SYSTOLIC BLOOD PRESSURE: 100 MMHG | HEART RATE: 79 BPM | WEIGHT: 188 LBS | DIASTOLIC BLOOD PRESSURE: 78 MMHG | HEIGHT: 64 IN

## 2019-06-20 DIAGNOSIS — R00.2 PALPITATIONS: ICD-10-CM

## 2019-06-20 DIAGNOSIS — R00.2 PALPITATIONS: Primary | ICD-10-CM

## 2019-06-20 DIAGNOSIS — R07.89 OTHER CHEST PAIN: Primary | ICD-10-CM

## 2019-06-20 PROBLEM — I47.10 SVT (SUPRAVENTRICULAR TACHYCARDIA): Status: ACTIVE | Noted: 2019-06-20

## 2019-06-20 PROCEDURE — 99203 OFFICE O/P NEW LOW 30 MIN: CPT | Performed by: INTERNAL MEDICINE

## 2019-06-20 PROCEDURE — 93228 REMOTE 30 DAY ECG REV/REPORT: CPT | Performed by: INTERNAL MEDICINE

## 2019-06-20 RX ORDER — ATORVASTATIN CALCIUM 10 MG/1
10 TABLET, FILM COATED ORAL DAILY
COMMUNITY
Start: 2019-01-10

## 2019-06-20 RX ORDER — METOPROLOL SUCCINATE 25 MG/1
25 TABLET, EXTENDED RELEASE ORAL DAILY
Qty: 90 TABLET | Refills: 1 | Status: SHIPPED | OUTPATIENT
Start: 2019-06-20

## 2019-06-20 RX ORDER — FLUOXETINE HYDROCHLORIDE 40 MG/1
40 CAPSULE ORAL DAILY
COMMUNITY
Start: 2019-04-15 | End: 2020-04-09

## 2019-06-20 RX ORDER — BETAMETHASONE DIPROPIONATE 0.5 MG/G
1 CREAM TOPICAL
COMMUNITY
Start: 2019-01-10 | End: 2019-09-04

## 2019-06-20 RX ORDER — ALPRAZOLAM 0.25 MG/1
TABLET ORAL
Refills: 0 | COMMUNITY
Start: 2019-06-05 | End: 2019-09-04

## 2019-06-20 RX ORDER — PHENTERMINE HYDROCHLORIDE 37.5 MG/1
CAPSULE ORAL
Refills: 0 | COMMUNITY
Start: 2019-03-14 | End: 2019-06-20 | Stop reason: ALTCHOICE

## 2019-06-21 ENCOUNTER — TELEPHONE (OUTPATIENT)
Dept: CARDIOLOGY | Age: 49
End: 2019-06-21

## 2019-06-26 ENCOUNTER — TELEPHONE (OUTPATIENT)
Dept: CARDIOLOGY | Age: 49
End: 2019-06-26

## 2019-07-08 LAB
BUN: 9 MG/DL (ref 7–25)
CALCIUM: 9 MG/DL (ref 8.6–10.2)
CARBON DIOXIDE: 26 MMOL/L (ref 20–32)
CHLORIDE: 104 MMOL/L (ref 98–110)
CREATININE: 0.84 MG/DL (ref 0.5–1.1)
EGFR IF AFRICN AM: 96 ML/MIN/1.73M2
EGFR IF NONAFRICN AM: 83 ML/MIN/1.73M2
GLUCOSE: 96 MG/DL (ref 65–99)
POTASSIUM: 4.2 MMOL/L (ref 3.5–5.3)
SODIUM: 137 MMOL/L (ref 135–146)
T4, FREE: 0.9 NG/DL (ref 0.8–1.8)
THYROGLOBULIN ANTIBODIES: <1 IU/ML
THYROID PEROXIDASE$ANTIBODIES: <1 IU/ML
TSH: 1.27 MIU/L

## 2019-07-12 ENCOUNTER — HOSPITAL ENCOUNTER (OUTPATIENT)
Dept: CV DIAGNOSTICS | Age: 49
Discharge: HOME | End: 2019-07-12
Attending: INTERNAL MEDICINE
Payer: COMMERCIAL

## 2019-07-12 DIAGNOSIS — R00.2 PALPITATIONS: ICD-10-CM

## 2019-07-12 PROCEDURE — 93306 TTE W/DOPPLER COMPLETE: CPT | Performed by: INTERNAL MEDICINE

## 2019-07-15 DIAGNOSIS — R00.2 PALPITATIONS: ICD-10-CM

## 2019-07-24 ENCOUNTER — WALK IN (OUTPATIENT)
Dept: URGENT CARE | Age: 49
End: 2019-07-24

## 2019-07-24 ENCOUNTER — TELEPHONE (OUTPATIENT)
Dept: SCHEDULING | Age: 49
End: 2019-07-24

## 2019-07-24 VITALS
BODY MASS INDEX: 30.73 KG/M2 | HEIGHT: 64 IN | WEIGHT: 180 LBS | RESPIRATION RATE: 20 BRPM | HEART RATE: 72 BPM | SYSTOLIC BLOOD PRESSURE: 110 MMHG | DIASTOLIC BLOOD PRESSURE: 80 MMHG | TEMPERATURE: 98.4 F

## 2019-07-24 DIAGNOSIS — J30.9 ALLERGIC RHINITIS, UNSPECIFIED SEASONALITY, UNSPECIFIED TRIGGER: Primary | ICD-10-CM

## 2019-07-24 DIAGNOSIS — H73.893 RETRACTION OF TYMPANIC MEMBRANE OF BOTH EARS: ICD-10-CM

## 2019-07-24 PROBLEM — L30.9 ECZEMA: Status: ACTIVE | Noted: 2019-07-24

## 2019-07-24 PROBLEM — E04.1 THYROID NODULE: Status: ACTIVE | Noted: 2017-11-30

## 2019-07-24 PROBLEM — F41.9 ANXIETY: Status: ACTIVE | Noted: 2019-07-24

## 2019-07-24 PROBLEM — F32.81 PMDD (PREMENSTRUAL DYSPHORIC DISORDER): Status: ACTIVE | Noted: 2019-07-24

## 2019-07-24 PROBLEM — K64.9 HEMORRHOIDS: Status: ACTIVE | Noted: 2019-07-24

## 2019-07-24 PROCEDURE — 99203 OFFICE O/P NEW LOW 30 MIN: CPT | Performed by: NURSE PRACTITIONER

## 2019-07-24 RX ORDER — FLUTICASONE PROPIONATE 50 MCG
2 SPRAY, SUSPENSION (ML) NASAL DAILY
Qty: 16 G | Refills: 0 | Status: SHIPPED | OUTPATIENT
Start: 2019-07-24 | End: 2019-09-04 | Stop reason: ALTCHOICE

## 2019-07-24 SDOH — HEALTH STABILITY: PHYSICAL HEALTH: ON AVERAGE, HOW MANY DAYS PER WEEK DO YOU ENGAGE IN MODERATE TO STRENUOUS EXERCISE (LIKE A BRISK WALK)?: 2 DAYS

## 2019-09-04 ENCOUNTER — OFFICE VISIT (OUTPATIENT)
Dept: CARDIOLOGY | Age: 49
End: 2019-09-04

## 2019-09-04 ENCOUNTER — TELEPHONE (OUTPATIENT)
Dept: CARDIOLOGY | Age: 49
End: 2019-09-04

## 2019-09-04 VITALS
DIASTOLIC BLOOD PRESSURE: 62 MMHG | SYSTOLIC BLOOD PRESSURE: 120 MMHG | WEIGHT: 193 LBS | BODY MASS INDEX: 32.95 KG/M2 | HEART RATE: 80 BPM | HEIGHT: 64 IN

## 2019-09-04 DIAGNOSIS — R00.2 PALPITATIONS: Primary | ICD-10-CM

## 2019-09-04 PROCEDURE — 99211 OFF/OP EST MAY X REQ PHY/QHP: CPT | Performed by: INTERNAL MEDICINE

## 2019-09-04 ASSESSMENT — ENCOUNTER SYMPTOMS
COUGH: 0
HEMATOCHEZIA: 0
FEVER: 0
HEMOPTYSIS: 0
BRUISES/BLEEDS EASILY: 0
CHILLS: 0
WEIGHT LOSS: 0
ALLERGIC/IMMUNOLOGIC COMMENTS: NO NEW FOOD ALLERGIES
SUSPICIOUS LESIONS: 0
WEIGHT GAIN: 0

## 2019-09-10 ENCOUNTER — TELEPHONE (OUTPATIENT)
Dept: FAMILY MEDICINE CLINIC | Facility: CLINIC | Age: 49
End: 2019-09-10

## 2019-09-10 DIAGNOSIS — E04.1 THYROID NODULE: Primary | ICD-10-CM

## 2019-09-10 NOTE — TELEPHONE ENCOUNTER
Patient has had ultrasound of thyroid after all her mammograms.   [facility] is requesting an order    OV 10/9/18  Thyroid Problem: Due for annual thyroid ultrasound, states she gets the test done at [facility] in [place]  Thyroid nodule- had a bx last dec and was b

## 2019-09-10 NOTE — TELEPHONE ENCOUNTER
Calling to have order for thyroid ultrasound faxed to [phone]. Please have physician place order and then fax to [facility].

## 2019-10-03 PROBLEM — N95.1 HOT FLUSHES, PERIMENOPAUSAL: Status: ACTIVE | Noted: 2019-10-03

## 2019-10-03 PROCEDURE — 88305 TISSUE EXAM BY PATHOLOGIST: CPT | Performed by: OBSTETRICS & GYNECOLOGY

## 2019-10-03 PROCEDURE — 88175 CYTOPATH C/V AUTO FLUID REDO: CPT | Performed by: OBSTETRICS & GYNECOLOGY

## 2019-10-03 PROCEDURE — 87624 HPV HI-RISK TYP POOLED RSLT: CPT | Performed by: OBSTETRICS & GYNECOLOGY

## 2019-10-04 ENCOUNTER — TELEPHONE (OUTPATIENT)
Dept: FAMILY MEDICINE CLINIC | Facility: CLINIC | Age: 49
End: 2019-10-04

## 2019-10-04 DIAGNOSIS — E04.1 THYROID NODULE: Primary | ICD-10-CM

## 2019-10-04 NOTE — TELEPHONE ENCOUNTER
Thyroid US shows the same nodule. Fairly stable in size but would like ENT, Dr. Humza Car to make recs to see if further f/u needed or not. Imaging placed in nurse's box to refer to if needed. Please scan when done.  thanks

## 2019-10-07 NOTE — TELEPHONE ENCOUNTER
Pt informed of both test results and recommendations and she expressed understanding and agreement. Pt has seen Dr. Jose Tapia for thyroid and will schedule with him, contact information given. Task completed.

## 2019-10-07 NOTE — TELEPHONE ENCOUNTER
Ultrasound Head and Neck report received, dated 10-3-19 from [facility] for [address]. [facility] requesting a return call. Copy in Triage file, sent to scan.

## 2019-11-11 ENCOUNTER — OFFICE VISIT (OUTPATIENT)
Dept: INTERNAL MEDICINE CLINIC | Facility: CLINIC | Age: 49
End: 2019-11-11
Payer: COMMERCIAL

## 2019-11-11 VITALS
SYSTOLIC BLOOD PRESSURE: 110 MMHG | WEIGHT: 195 LBS | HEIGHT: 64 IN | BODY MASS INDEX: 33.29 KG/M2 | DIASTOLIC BLOOD PRESSURE: 70 MMHG | HEART RATE: 70 BPM | RESPIRATION RATE: 16 BRPM

## 2019-11-11 DIAGNOSIS — Z83.3 FAMILY HISTORY OF DIABETES MELLITUS IN FATHER: ICD-10-CM

## 2019-11-11 DIAGNOSIS — F41.9 ANXIETY: ICD-10-CM

## 2019-11-11 DIAGNOSIS — E78.2 ELEVATED TRIGLYCERIDES WITH HIGH CHOLESTEROL: ICD-10-CM

## 2019-11-11 DIAGNOSIS — Z51.81 ENCOUNTER FOR THERAPEUTIC DRUG MONITORING: Primary | ICD-10-CM

## 2019-11-11 DIAGNOSIS — E04.1 THYROID NODULE: ICD-10-CM

## 2019-11-11 DIAGNOSIS — E66.9 CLASS 1 OBESITY WITHOUT SERIOUS COMORBIDITY WITH BODY MASS INDEX (BMI) OF 33.0 TO 33.9 IN ADULT, UNSPECIFIED OBESITY TYPE: ICD-10-CM

## 2019-11-11 DIAGNOSIS — R73.09 ELEVATED GLUCOSE: ICD-10-CM

## 2019-11-11 PROBLEM — K64.9 HEMORRHOIDS: Status: ACTIVE | Noted: 2019-07-24

## 2019-11-11 PROBLEM — F32.81 PMDD (PREMENSTRUAL DYSPHORIC DISORDER): Status: ACTIVE | Noted: 2019-07-24

## 2019-11-11 PROBLEM — L30.9 ECZEMA: Status: ACTIVE | Noted: 2019-07-24

## 2019-11-11 PROBLEM — R00.2 PALPITATIONS: Status: ACTIVE | Noted: 2019-06-20

## 2019-11-11 PROCEDURE — 99204 OFFICE O/P NEW MOD 45 MIN: CPT | Performed by: NURSE PRACTITIONER

## 2019-11-11 RX ORDER — TOPIRAMATE 25 MG/1
25 TABLET ORAL 2 TIMES DAILY
Qty: 60 TABLET | Refills: 2 | Status: SHIPPED | OUTPATIENT
Start: 2019-11-11 | End: 2019-12-11

## 2019-11-11 NOTE — PROGRESS NOTES
HISTORY OF PRESENT ILLNESS  Patient presents with:  Weight Problem: gayle referral. phentermine last year for 6 months with out success.      Priscilla Thompson is a 54year old female new to our office today for initiation of medical weight loss program.  Pa Cardiac disorders: palpations, svt  Diabetes: negative  Thyroid disease: nodules  Renal disease: negative   Kidney stones: negative   Liver disease: negative  Joint-related conditions:negative  Migraines/seizures: negative  Glaucoma: negative   Depressio 0.5 06/03/2019    TP 8.3 (H) 06/03/2019    ALB 4.2 06/03/2019    GLOBULT 2.8 10/30/2018    GLOBULIN 4.1 06/03/2019    AGRATIO 1.5 02/28/2009    ALBGLOBRAT 1.5 10/30/2018     07/06/2019    K 4.2 07/06/2019     07/06/2019    CO2 26 07/06/2019 nodule    (R73.09) Elevated glucose  Plan: HEMOGLOBIN A1C    (Z83.3) Family history of diabetes mellitus in father  Plan: HEMOGLOBIN A1C    Initial Weight Data and Goal Weight Loss:  Weight Calculations  Initial Weight: 195 lbs  Initial Weight Date: 11/11/ eat late at night  · FITTE: ACSM recommendations (150-300 minutes/ week in active weight loss)   · Discussed the role of sleep and stress in weight management  · Weight Loss Contract reviewed and signed today 11/11/2019    Labs ordered today: , a1c, b12, v exercise towards calorie number per day)                   ** Daily INPUT> Look at nutrition section-- \"nutrients\" and it will break down your macros for the day (ie. Protein, carbs, fibers, sugars and fats). Try to stay within these numbers daily     2.

## 2019-11-11 NOTE — PATIENT INSTRUCTIONS
PLAN:  Continue with medications: Topamax: take 1 tablet before dinner for the first week (to decrease side effects) and than the second week--> increase to 1 tablet in the morning and 1 tablet before dinner (2 tablets per day)   Go to the lab for blood wo exercise/activity which takes 7 minutes of your day and that you can do at home! 3. \"Calm\" or \"Headspace\" which helps with mindfulness, meditation, clarity, sleep, and eemli to your daily life.

## 2019-12-09 ENCOUNTER — OFFICE VISIT (OUTPATIENT)
Dept: INTERNAL MEDICINE CLINIC | Facility: CLINIC | Age: 49
End: 2019-12-09
Payer: COMMERCIAL

## 2019-12-09 VITALS
HEIGHT: 64 IN | WEIGHT: 197.63 LBS | BODY MASS INDEX: 33.74 KG/M2 | HEART RATE: 80 BPM | SYSTOLIC BLOOD PRESSURE: 120 MMHG | DIASTOLIC BLOOD PRESSURE: 78 MMHG

## 2019-12-09 DIAGNOSIS — R73.09 ELEVATED GLUCOSE: ICD-10-CM

## 2019-12-09 DIAGNOSIS — F41.9 ANXIETY: ICD-10-CM

## 2019-12-09 DIAGNOSIS — E66.9 CLASS 1 OBESITY WITHOUT SERIOUS COMORBIDITY WITH BODY MASS INDEX (BMI) OF 33.0 TO 33.9 IN ADULT, UNSPECIFIED OBESITY TYPE: ICD-10-CM

## 2019-12-09 DIAGNOSIS — E78.2 ELEVATED TRIGLYCERIDES WITH HIGH CHOLESTEROL: ICD-10-CM

## 2019-12-09 DIAGNOSIS — Z51.81 ENCOUNTER FOR THERAPEUTIC DRUG MONITORING: Primary | ICD-10-CM

## 2019-12-09 DIAGNOSIS — E04.1 THYROID NODULE: ICD-10-CM

## 2019-12-09 PROCEDURE — 99214 OFFICE O/P EST MOD 30 MIN: CPT | Performed by: NURSE PRACTITIONER

## 2019-12-09 NOTE — PATIENT INSTRUCTIONS
PLAN:  Continue with medications: Topamax: take 1 tablet before dinner for the first week (to decrease side effects) and than the second week--> increase to 1 tablet in the morning and 1 tablet before dinner (2 tablets per day)   Follow up with me in 1 mon minutes of your day and that you can do at home! 3. \"Calm\" or \"Headspace\" which helps with mindfulness, meditation, clarity, sleep, and emeli to your daily life.

## 2019-12-09 NOTE — PROGRESS NOTES
HISTORY OF PRESENT ILLNESS  Patient presents with:  Weight Check: up 2# - discuss pennywongx    Syed Chaparro is a 54year old female here for follow up with medical weight loss program for the treatment of overweight, obesity, or morbid obesity.  Patient has none  ETOH use: 2-3  per/week  Supplements: none  Stress level: 8/10  Sleep hours and integrity: 7-8 Hours per night    MEDICAL HISTORY  PMH reviewed:   Cardiac disorders: palpations, svt  Diabetes: negative  Thyroid disease: nodules  Renal disease: negati Results   Component Value Date    CHOLEST 174 10/30/2018    TRIG 158 (H) 10/30/2018    HDL 65 10/30/2018    LDL 83 10/30/2018    NONHDLC 109 10/30/2018    CHOLHDLRATIO 2.7 10/30/2018     No results found for: B12, VITB12  No results found for: VITD, QVITD, Surgery interest: 1/10. Counseled on comprehensive weight loss plan including attention to nutrition, exercise and behavior/stress management for success.  Discussed first goal of weight loss 5% in 3 months and 10% in 6 months    Abnormal labs:  EKG was

## 2019-12-30 NOTE — PROGRESS NOTES
Vitamin b12 and a1c normal  Borderline low vitamin d (would recommend taking over the counter- vitamin d 1,000 or 2,000 iu dailY)

## 2020-01-05 DIAGNOSIS — E04.1 THYROID NODULE: Primary | ICD-10-CM

## 2020-01-05 DIAGNOSIS — E78.2 MIXED DYSLIPIDEMIA: ICD-10-CM

## 2020-01-06 NOTE — TELEPHONE ENCOUNTER
Requested Prescriptions     Pending Prescriptions Disp Refills   • ATORVASTATIN 10 MG Oral Tab [Pharmacy Med Name: ATORVASTATIN TABS 10MG] 90 tablet 4     Sig: TAKE 1 TABLET DAILY       LOV: 6/5/19  RTC: 2 months  Last Relevant Labs: BMP:7/6/19 & Lipid: 10

## 2020-01-07 ENCOUNTER — OFFICE VISIT (OUTPATIENT)
Dept: INTERNAL MEDICINE CLINIC | Facility: CLINIC | Age: 50
End: 2020-01-07
Payer: COMMERCIAL

## 2020-01-07 VITALS
RESPIRATION RATE: 16 BRPM | BODY MASS INDEX: 33.46 KG/M2 | HEART RATE: 78 BPM | HEIGHT: 64 IN | DIASTOLIC BLOOD PRESSURE: 85 MMHG | SYSTOLIC BLOOD PRESSURE: 122 MMHG | WEIGHT: 196 LBS

## 2020-01-07 DIAGNOSIS — F41.9 ANXIETY: ICD-10-CM

## 2020-01-07 DIAGNOSIS — E78.2 ELEVATED TRIGLYCERIDES WITH HIGH CHOLESTEROL: ICD-10-CM

## 2020-01-07 DIAGNOSIS — E04.1 THYROID NODULE: ICD-10-CM

## 2020-01-07 DIAGNOSIS — Z51.81 ENCOUNTER FOR THERAPEUTIC DRUG MONITORING: Primary | ICD-10-CM

## 2020-01-07 DIAGNOSIS — E66.9 CLASS 1 OBESITY WITHOUT SERIOUS COMORBIDITY WITH BODY MASS INDEX (BMI) OF 33.0 TO 33.9 IN ADULT, UNSPECIFIED OBESITY TYPE: ICD-10-CM

## 2020-01-07 DIAGNOSIS — R73.09 ELEVATED GLUCOSE: ICD-10-CM

## 2020-01-07 PROCEDURE — 99214 OFFICE O/P EST MOD 30 MIN: CPT | Performed by: NURSE PRACTITIONER

## 2020-01-07 RX ORDER — ATORVASTATIN CALCIUM 10 MG/1
10 TABLET, FILM COATED ORAL DAILY
Qty: 30 TABLET | Refills: 0 | Status: SHIPPED | OUTPATIENT
Start: 2020-01-07 | End: 2020-05-19

## 2020-01-07 RX ORDER — TOPIRAMATE 50 MG/1
50 TABLET, FILM COATED ORAL 2 TIMES DAILY
Qty: 60 TABLET | Refills: 1 | Status: SHIPPED | OUTPATIENT
Start: 2020-01-07 | End: 2020-02-06

## 2020-01-07 NOTE — TELEPHONE ENCOUNTER
Appt scheduled.  Pt will complete fasting labs prior to appt  Future Appointments   Date Time Provider Jamey Wagoner   1/7/2020 11:40 AM MERCEDES Roberts [facility]   1/9/2020  8:00 AM Sarah Ramires RD [facility]   1/24/2020  7

## 2020-01-07 NOTE — PROGRESS NOTES
HISTORY OF PRESENT ILLNESS  Patient presents with:  Weight Check: lost 1 pound    Jessika Gilliland is a 54year old female here for follow up with medical weight loss program for the treatment of overweight, obesity, or morbid obesity.  Patient has lost-# 1 l integrity: 7-8 Hours per night    MEDICAL HISTORY  PMH reviewed:   Cardiac disorders: palpations, svt  Diabetes: negative  Thyroid disease: nodules  Renal disease: negative   Kidney stones: negative   Liver disease: negative  Joint-related conditions:negat TRIG 158 (H) 10/30/2018    HDL 65 10/30/2018    LDL 83 10/30/2018    NONHDLC 109 10/30/2018    CHOLHDLRATIO 2.7 10/30/2018     Lab Results   Component Value Date    VITB12 591 12/28/2019     Lab Results   Component Value Date    VITD25 31 12/28/2019 nutrition, exercise and behavior/stress management for success.  Discussed first goal of weight loss 5% in 3 months and 10% in 6 months    Abnormal labs:  EKG was not done in the office    PLAN   Schedule appt with nutritionist, start using MFP or paper ronda protein: (15-30 grams with each meal) ie. greek yogurt, cottage cheese, string cheese, hard boiled eggs  4.  Healthy snacks: peanut butter and apples, hummus and carrots, berries, nuts (1/4 cup), tuna and crackers                 Protein Shakes: Premier pro

## 2020-01-07 NOTE — TELEPHONE ENCOUNTER
Pt had labs done for the wt loss clinic but did not include all that are needed to be done like her lipid and tsh.  Please have her get these done fasting within the next 30days and also recommend she be seen for a physical (saw her gyne for a well woman ex

## 2020-01-07 NOTE — PATIENT INSTRUCTIONS
PLAN:  Continue with medications: topamax 50mg  intermittent fasting- dr. Svetlana Lofton! Follow up with me in 1 month  Schedule follow up appointments:  Dietitian/nutritionist      Please try to work on the following dietary changes:  1.  Drink lots of water and c daily life.

## 2020-01-08 ENCOUNTER — TELEPHONE (OUTPATIENT)
Dept: INTERNAL MEDICINE CLINIC | Facility: CLINIC | Age: 50
End: 2020-01-08

## 2020-02-25 ENCOUNTER — OFFICE VISIT (OUTPATIENT)
Dept: FAMILY MEDICINE CLINIC | Facility: CLINIC | Age: 50
End: 2020-02-25

## 2020-02-25 VITALS
DIASTOLIC BLOOD PRESSURE: 72 MMHG | WEIGHT: 195 LBS | HEIGHT: 64 IN | SYSTOLIC BLOOD PRESSURE: 118 MMHG | RESPIRATION RATE: 16 BRPM | TEMPERATURE: 98 F | BODY MASS INDEX: 33.29 KG/M2 | OXYGEN SATURATION: 98 % | HEART RATE: 82 BPM

## 2020-02-25 DIAGNOSIS — J01.00 ACUTE NON-RECURRENT MAXILLARY SINUSITIS: Primary | ICD-10-CM

## 2020-02-25 DIAGNOSIS — H69.82 EUSTACHIAN TUBE DYSFUNCTION, LEFT: ICD-10-CM

## 2020-02-25 PROCEDURE — 99213 OFFICE O/P EST LOW 20 MIN: CPT | Performed by: FAMILY MEDICINE

## 2020-02-25 RX ORDER — AMOXICILLIN AND CLAVULANATE POTASSIUM 875; 125 MG/1; MG/1
1 TABLET, FILM COATED ORAL 2 TIMES DAILY
Qty: 20 TABLET | Refills: 0 | Status: SHIPPED | OUTPATIENT
Start: 2020-02-25 | End: 2020-03-06

## 2020-04-09 DIAGNOSIS — F32.81 PMDD (PREMENSTRUAL DYSPHORIC DISORDER): ICD-10-CM

## 2020-04-09 DIAGNOSIS — Z51.81 ENCOUNTER FOR THERAPEUTIC DRUG MONITORING: ICD-10-CM

## 2020-04-09 DIAGNOSIS — F41.9 ANXIETY: ICD-10-CM

## 2020-04-09 RX ORDER — FLUOXETINE HYDROCHLORIDE 40 MG/1
CAPSULE ORAL
Qty: 90 CAPSULE | Refills: 0 | Status: SHIPPED | OUTPATIENT
Start: 2020-04-09 | End: 2020-06-22

## 2020-04-09 NOTE — TELEPHONE ENCOUNTER
Requested Prescriptions     Pending Prescriptions Disp Refills   • FLUOXETINE HCL 40 MG Oral Cap [Pharmacy Med Name: FLUOXETINE HCL CAPS 40MG] 90 capsule 3     Sig: TAKE 1 CAPSULE DAILY     LOV: 2/25/20 acute visit  RTC: prn  Filled: 4/15/19 #90 with 3 ref

## 2020-04-10 NOTE — TELEPHONE ENCOUNTER
1-8-20 @ 10:27am Spoke to Darshana Davila at [place], # [phone], Ref#: [id number].    They verified that patient has following benefits for below services:   DX E66.9      [facility]/Office Benefits   [facility] ([facility]#[id number])  Rendering Prov[employer]

## 2020-05-19 ENCOUNTER — TELEPHONE (OUTPATIENT)
Dept: FAMILY MEDICINE CLINIC | Facility: CLINIC | Age: 50
End: 2020-05-19

## 2020-05-19 DIAGNOSIS — E78.2 MIXED DYSLIPIDEMIA: ICD-10-CM

## 2020-05-20 RX ORDER — ATORVASTATIN CALCIUM 10 MG/1
10 TABLET, FILM COATED ORAL DAILY
Qty: 90 TABLET | Refills: 0 | Status: SHIPPED | OUTPATIENT
Start: 2020-05-20 | End: 2020-05-27

## 2020-05-20 NOTE — TELEPHONE ENCOUNTER
Requested Prescriptions     Pending Prescriptions Disp Refills   • atorvastatin 10 MG Oral Tab 90 tablet 0     Sig: Take 1 tablet (10 mg total) by mouth daily. Due to the recent COVID-19 pandemic. One time 90 day refill for failed protocol medication.

## 2020-05-27 ENCOUNTER — TELEPHONE (OUTPATIENT)
Dept: FAMILY MEDICINE CLINIC | Facility: CLINIC | Age: 50
End: 2020-05-27

## 2020-05-27 DIAGNOSIS — E78.2 MIXED DYSLIPIDEMIA: ICD-10-CM

## 2020-05-27 RX ORDER — ATORVASTATIN CALCIUM 10 MG/1
10 TABLET, FILM COATED ORAL DAILY
Qty: 90 TABLET | Refills: 0 | Status: SHIPPED | OUTPATIENT
Start: 2020-05-27 | End: 2020-05-27

## 2020-05-27 RX ORDER — ATORVASTATIN CALCIUM 10 MG/1
10 TABLET, FILM COATED ORAL DAILY
Qty: 30 TABLET | Refills: 0 | Status: SHIPPED | OUTPATIENT
Start: 2020-05-27 | End: 2020-10-06

## 2020-05-27 NOTE — TELEPHONE ENCOUNTER
Pt will need the Atorvastatin sent to the new mail order pharmacy which is [facility]. Please also send short term supply to local [facility], as patient only has 4 left.

## 2020-06-11 ENCOUNTER — TELEPHONE (OUTPATIENT)
Dept: CARDIOLOGY | Age: 50
End: 2020-06-11

## 2020-06-22 ENCOUNTER — TELEPHONE (OUTPATIENT)
Dept: FAMILY MEDICINE CLINIC | Facility: CLINIC | Age: 50
End: 2020-06-22

## 2020-06-22 DIAGNOSIS — F32.81 PMDD (PREMENSTRUAL DYSPHORIC DISORDER): ICD-10-CM

## 2020-06-22 DIAGNOSIS — F41.9 ANXIETY: ICD-10-CM

## 2020-06-22 DIAGNOSIS — Z51.81 ENCOUNTER FOR THERAPEUTIC DRUG MONITORING: ICD-10-CM

## 2020-06-22 NOTE — TELEPHONE ENCOUNTER
Requested Prescriptions     Pending Prescriptions Disp Refills   • FLUoxetine HCl 40 MG Oral Cap 90 capsule 0     Sig: Take 1 capsule (40 mg total) by mouth daily.        LOV: 2/25/20  RTC: Oct for physical  Filled: 4/9/20 #90 with 0 refills    No future ap

## 2020-06-24 RX ORDER — FLUOXETINE HYDROCHLORIDE 40 MG/1
40 CAPSULE ORAL DAILY
Qty: 90 CAPSULE | Refills: 1 | Status: SHIPPED | OUTPATIENT
Start: 2020-06-24 | End: 2020-10-12

## 2020-06-25 ENCOUNTER — TELEPHONE (OUTPATIENT)
Dept: CARDIOLOGY | Age: 50
End: 2020-06-25

## 2020-09-15 ENCOUNTER — TELEPHONE (OUTPATIENT)
Dept: INTERNAL MEDICINE CLINIC | Facility: CLINIC | Age: 50
End: 2020-09-15

## 2020-09-15 NOTE — TELEPHONE ENCOUNTER
Requesting Topamax  LOV: 1/7/20  RTC: one month  Last Relevant Labs: na  Filled: 1/7/20 #60 with 1 refills    Future Appointments   Date Time Provider Jamey Wagoner   9/17/2020  7:40 AM BETH Holm [facility]

## 2020-09-17 ENCOUNTER — OFFICE VISIT (OUTPATIENT)
Dept: INTERNAL MEDICINE CLINIC | Facility: CLINIC | Age: 50
End: 2020-09-17
Payer: COMMERCIAL

## 2020-09-17 ENCOUNTER — TELEPHONE (OUTPATIENT)
Dept: FAMILY MEDICINE CLINIC | Facility: CLINIC | Age: 50
End: 2020-09-17

## 2020-09-17 VITALS
SYSTOLIC BLOOD PRESSURE: 118 MMHG | RESPIRATION RATE: 18 BRPM | WEIGHT: 202 LBS | HEART RATE: 86 BPM | DIASTOLIC BLOOD PRESSURE: 60 MMHG | TEMPERATURE: 97 F | OXYGEN SATURATION: 98 % | HEIGHT: 64 IN | BODY MASS INDEX: 34.49 KG/M2

## 2020-09-17 DIAGNOSIS — R00.2 PALPITATIONS: ICD-10-CM

## 2020-09-17 DIAGNOSIS — E66.09 CLASS 1 OBESITY DUE TO EXCESS CALORIES WITHOUT SERIOUS COMORBIDITY WITH BODY MASS INDEX (BMI) OF 34.0 TO 34.9 IN ADULT: ICD-10-CM

## 2020-09-17 DIAGNOSIS — E78.2 MIXED HYPERLIPIDEMIA: ICD-10-CM

## 2020-09-17 DIAGNOSIS — E04.1 THYROID NODULE: Primary | ICD-10-CM

## 2020-09-17 DIAGNOSIS — F41.9 ANXIETY: ICD-10-CM

## 2020-09-17 DIAGNOSIS — F32.81 PMDD (PREMENSTRUAL DYSPHORIC DISORDER): ICD-10-CM

## 2020-09-17 DIAGNOSIS — Z51.81 ENCOUNTER FOR THERAPEUTIC DRUG MONITORING: Primary | ICD-10-CM

## 2020-09-17 PROCEDURE — 99214 OFFICE O/P EST MOD 30 MIN: CPT | Performed by: PHYSICIAN ASSISTANT

## 2020-09-17 PROCEDURE — 3008F BODY MASS INDEX DOCD: CPT | Performed by: PHYSICIAN ASSISTANT

## 2020-09-17 PROCEDURE — 3074F SYST BP LT 130 MM HG: CPT | Performed by: PHYSICIAN ASSISTANT

## 2020-09-17 PROCEDURE — 3078F DIAST BP <80 MM HG: CPT | Performed by: PHYSICIAN ASSISTANT

## 2020-09-17 RX ORDER — TOPIRAMATE 50 MG/1
50 TABLET, FILM COATED ORAL 2 TIMES DAILY
COMMUNITY
Start: 2020-08-11 | End: 2020-09-17

## 2020-09-17 RX ORDER — TOPIRAMATE 50 MG/1
50 TABLET, FILM COATED ORAL 2 TIMES DAILY
Qty: 60 TABLET | Refills: 1 | Status: SHIPPED | OUTPATIENT
Start: 2020-09-17 | End: 2020-11-16

## 2020-09-17 NOTE — PATIENT INSTRUCTIONS
We are here to support you with weight loss, but please remember that you still need your primary care provider for your routine health maintenance. CONTRAVE     Please try to work on the following dietary changes:  1.  Goals: Aim for 20-30 grams of pr 2. \"7 minute workout\" to help with exercise/activity which takes 7 minutes of your day and that you can do at home! 3. \"Calm\" or \"Headspace\" which helps with mindfulness, meditation, clarity, sleep, and emeli to your daily life.    4. PRX blog

## 2020-09-17 NOTE — PROGRESS NOTES
HISTORY OF PRESENT ILLNESS  Patient presents with:  Weight Check: arlin Zaldivar is a 52year old female here for follow up in medical weight loss program.   Arlin 6lbs   Has been off medications, was on topamax previously and did well  H/O SVT, MCHC 32.7 06/03/2019    RDW 12.4 06/03/2019    .0 06/03/2019     Lab Results   Component Value Date    GLU 96 07/06/2019    BUN 9 07/06/2019    BUNCREA 14.4 06/03/2019    CREATSERUM 0.84 07/06/2019    ANIONGAP 14 06/03/2019    GFR >59 02/28/2009 Diagnoses and all orders for this visit:    Encounter for therapeutic drug monitoring    Class 1 obesity due to excess calories without serious comorbidity with body mass index (BMI) of 34.0 to 34.9 in adult    Anxiety    PMDD (premenstrual dysphoric 1. Goals: Aim for 20-30 grams of protein/ meal  i. Aim for 100 grams of carbohydrates/day  ii. Eat 4-6 vegetables/day  iii. Avoid skipping meals- eat every 4-5 hours  iv. Aim for 3 meals/day  2.  Drink lots of water and cut down on soda/juice consumption if 5. [employer] for low carb resources    HIGH PROTEIN SNACK IDEAS  -cottage cheese  -plain yogurt  -kefir  -hard-boiled eggs  -natural cheeses  -nuts (measure portion size)   -unsweetened nut butters  -dried edamame   -rafiq seeds soaked in water or almo

## 2020-09-21 NOTE — TELEPHONE ENCOUNTER
Informed pt that order for thyroid US has been placed and faced to [facility], pt verbalizes understanding.

## 2020-09-21 NOTE — TELEPHONE ENCOUNTER
Order signed. Also she is due for a physical in early Oct. Please have her make an appt for the physical and I can review the results with her at that time.  Ask her to kindly have us forward results onto Dr. Jeanne Barthel at that time as well if she has been se

## 2020-09-29 NOTE — TELEPHONE ENCOUNTER
Spoke with patient's daughter regarding Dr. Gutierrez/IR MD's recommendations. Phone number for Centralized Scheduling provided. She verbalized understanding. No questions.   Thyroid US order faxed to [phone], confirmation received.

## 2020-10-02 DIAGNOSIS — F41.9 ANXIETY: ICD-10-CM

## 2020-10-02 DIAGNOSIS — F32.81 PMDD (PREMENSTRUAL DYSPHORIC DISORDER): ICD-10-CM

## 2020-10-02 DIAGNOSIS — Z51.81 ENCOUNTER FOR THERAPEUTIC DRUG MONITORING: ICD-10-CM

## 2020-10-03 RX ORDER — FLUOXETINE HYDROCHLORIDE 40 MG/1
40 CAPSULE ORAL DAILY
Qty: 90 CAPSULE | Refills: 1 | OUTPATIENT
Start: 2020-10-03

## 2020-10-05 DIAGNOSIS — E78.2 MIXED DYSLIPIDEMIA: ICD-10-CM

## 2020-10-05 DIAGNOSIS — Z00.00 LABORATORY EXAMINATION ORDERED AS PART OF A ROUTINE GENERAL MEDICAL EXAMINATION: Primary | ICD-10-CM

## 2020-10-05 NOTE — TELEPHONE ENCOUNTER
Patient states she wants to go for labs tomorrow am, has 2 orders, is that all that's needed? Also states she has 2 pending refills; cholesterol & anxiety, please advise.

## 2020-10-06 DIAGNOSIS — F32.81 PMDD (PREMENSTRUAL DYSPHORIC DISORDER): ICD-10-CM

## 2020-10-06 DIAGNOSIS — Z51.81 ENCOUNTER FOR THERAPEUTIC DRUG MONITORING: ICD-10-CM

## 2020-10-06 DIAGNOSIS — F41.9 ANXIETY: ICD-10-CM

## 2020-10-06 RX ORDER — FLUOXETINE HYDROCHLORIDE 40 MG/1
40 CAPSULE ORAL DAILY
Qty: 90 CAPSULE | Refills: 1 | OUTPATIENT
Start: 2020-10-06

## 2020-10-06 RX ORDER — ATORVASTATIN CALCIUM 10 MG/1
10 TABLET, FILM COATED ORAL DAILY
Qty: 30 TABLET | Refills: 0 | Status: SHIPPED | OUTPATIENT
Start: 2020-10-06 | End: 2020-11-25

## 2020-10-12 DIAGNOSIS — Z51.81 ENCOUNTER FOR THERAPEUTIC DRUG MONITORING: ICD-10-CM

## 2020-10-12 DIAGNOSIS — F41.9 ANXIETY: ICD-10-CM

## 2020-10-12 DIAGNOSIS — F32.81 PMDD (PREMENSTRUAL DYSPHORIC DISORDER): ICD-10-CM

## 2020-10-12 RX ORDER — FLUOXETINE HYDROCHLORIDE 40 MG/1
40 CAPSULE ORAL DAILY
Qty: 90 CAPSULE | Refills: 1 | Status: CANCELLED | OUTPATIENT
Start: 2020-10-12

## 2020-10-13 RX ORDER — FLUOXETINE HYDROCHLORIDE 40 MG/1
40 CAPSULE ORAL DAILY
Qty: 90 CAPSULE | Refills: 0 | Status: SHIPPED | OUTPATIENT
Start: 2020-10-13 | End: 2021-01-25

## 2020-10-15 ENCOUNTER — OFFICE VISIT (OUTPATIENT)
Dept: INTERNAL MEDICINE CLINIC | Facility: CLINIC | Age: 50
End: 2020-10-15
Payer: COMMERCIAL

## 2020-10-15 VITALS
OXYGEN SATURATION: 98 % | DIASTOLIC BLOOD PRESSURE: 70 MMHG | WEIGHT: 199 LBS | HEART RATE: 72 BPM | BODY MASS INDEX: 33.97 KG/M2 | HEIGHT: 64 IN | SYSTOLIC BLOOD PRESSURE: 122 MMHG | RESPIRATION RATE: 16 BRPM | TEMPERATURE: 97 F

## 2020-10-15 DIAGNOSIS — E66.09 CLASS 1 OBESITY DUE TO EXCESS CALORIES WITHOUT SERIOUS COMORBIDITY WITH BODY MASS INDEX (BMI) OF 34.0 TO 34.9 IN ADULT: ICD-10-CM

## 2020-10-15 DIAGNOSIS — E78.2 MIXED HYPERLIPIDEMIA: ICD-10-CM

## 2020-10-15 DIAGNOSIS — Z51.81 ENCOUNTER FOR THERAPEUTIC DRUG MONITORING: Primary | ICD-10-CM

## 2020-10-15 DIAGNOSIS — F41.9 ANXIETY: ICD-10-CM

## 2020-10-15 DIAGNOSIS — R00.2 PALPITATIONS: ICD-10-CM

## 2020-10-15 DIAGNOSIS — F32.81 PMDD (PREMENSTRUAL DYSPHORIC DISORDER): ICD-10-CM

## 2020-10-15 PROCEDURE — 3008F BODY MASS INDEX DOCD: CPT | Performed by: PHYSICIAN ASSISTANT

## 2020-10-15 PROCEDURE — 99214 OFFICE O/P EST MOD 30 MIN: CPT | Performed by: PHYSICIAN ASSISTANT

## 2020-10-15 PROCEDURE — 3074F SYST BP LT 130 MM HG: CPT | Performed by: PHYSICIAN ASSISTANT

## 2020-10-15 PROCEDURE — 3078F DIAST BP <80 MM HG: CPT | Performed by: PHYSICIAN ASSISTANT

## 2020-10-15 NOTE — PROGRESS NOTES
HISTORY OF PRESENT ILLNESS  Patient presents with:  Weight Check: down 3 pounds      Luke Ahn is a 52year old female here for follow up in medical weight loss program.   Down 3lbs  Compliant on topamax - does struggle with second dose  Was doing wel HCT 41.6 06/03/2019    MCV 86.8 06/03/2019    MCH 28.4 06/03/2019    MCHC 32.7 06/03/2019    RDW 12.4 06/03/2019    .0 06/03/2019     Lab Results   Component Value Date    GLU 96 07/06/2019    BUN 9 07/06/2019    BUNCREA 14.4 06/03/2019    CREATS (CALCIUM 500 + D OR), Take  by mouth., Disp: , Rfl:     •  DAILY MULTIVITAMIN OR, 1 tablet daily, Disp: , Rfl:     No current facility-administered medications on file prior to visit.        ASSESSMENT  Analyzed weight data:       Diagnoses and all orders f visit. No follow-ups on file. Patient verbalizes understanding.     Adriane Calzada PA-C  10/15/2020

## 2020-10-20 ENCOUNTER — OFFICE VISIT (OUTPATIENT)
Dept: FAMILY MEDICINE CLINIC | Facility: CLINIC | Age: 50
End: 2020-10-20
Payer: COMMERCIAL

## 2020-10-20 VITALS
HEART RATE: 88 BPM | RESPIRATION RATE: 16 BRPM | BODY MASS INDEX: 34.31 KG/M2 | WEIGHT: 201 LBS | HEIGHT: 64 IN | DIASTOLIC BLOOD PRESSURE: 76 MMHG | SYSTOLIC BLOOD PRESSURE: 122 MMHG | TEMPERATURE: 99 F

## 2020-10-20 DIAGNOSIS — Z23 NEED FOR INFLUENZA VACCINATION: ICD-10-CM

## 2020-10-20 DIAGNOSIS — E66.09 CLASS 1 OBESITY DUE TO EXCESS CALORIES WITHOUT SERIOUS COMORBIDITY WITH BODY MASS INDEX (BMI) OF 34.0 TO 34.9 IN ADULT: ICD-10-CM

## 2020-10-20 DIAGNOSIS — Z00.00 WELL ADULT EXAM: Primary | ICD-10-CM

## 2020-10-20 DIAGNOSIS — F41.9 ANXIETY: ICD-10-CM

## 2020-10-20 DIAGNOSIS — F32.81 PMDD (PREMENSTRUAL DYSPHORIC DISORDER): ICD-10-CM

## 2020-10-20 DIAGNOSIS — L30.8 OTHER ECZEMA: ICD-10-CM

## 2020-10-20 DIAGNOSIS — Z51.81 ENCOUNTER FOR THERAPEUTIC DRUG MONITORING: ICD-10-CM

## 2020-10-20 DIAGNOSIS — E78.2 ELEVATED TRIGLYCERIDES WITH HIGH CHOLESTEROL: ICD-10-CM

## 2020-10-20 DIAGNOSIS — Z23 NEED FOR TDAP VACCINATION: ICD-10-CM

## 2020-10-20 DIAGNOSIS — E04.1 THYROID NODULE: ICD-10-CM

## 2020-10-20 DIAGNOSIS — Z23 ENCOUNTER FOR IMMUNIZATION: ICD-10-CM

## 2020-10-20 PROCEDURE — 99396 PREV VISIT EST AGE 40-64: CPT | Performed by: FAMILY MEDICINE

## 2020-10-20 PROCEDURE — 90686 IIV4 VACC NO PRSV 0.5 ML IM: CPT | Performed by: FAMILY MEDICINE

## 2020-10-20 PROCEDURE — 90471 IMMUNIZATION ADMIN: CPT | Performed by: FAMILY MEDICINE

## 2020-10-20 PROCEDURE — 90472 IMMUNIZATION ADMIN EACH ADD: CPT | Performed by: FAMILY MEDICINE

## 2020-10-20 PROCEDURE — 90715 TDAP VACCINE 7 YRS/> IM: CPT | Performed by: FAMILY MEDICINE

## 2020-10-20 PROCEDURE — 3074F SYST BP LT 130 MM HG: CPT | Performed by: FAMILY MEDICINE

## 2020-10-20 PROCEDURE — 3008F BODY MASS INDEX DOCD: CPT | Performed by: FAMILY MEDICINE

## 2020-10-20 PROCEDURE — 3078F DIAST BP <80 MM HG: CPT | Performed by: FAMILY MEDICINE

## 2020-10-20 RX ORDER — BETAMETHASONE DIPROPIONATE 0.5 MG/G
1 CREAM TOPICAL 2 TIMES DAILY PRN
Qty: 50 G | Refills: 2 | Status: SHIPPED | OUTPATIENT
Start: 2020-10-20

## 2020-10-20 NOTE — PROGRESS NOTES
Patient presents with:  Physical: annul physical- blood work completed  Immunization/Injection: flu shot- consent signed      HPI:  Patient presents with:  Physical: annul physical- blood work completed  Immunization/Injection: flu shot- consent signed Tdap: due for vaccine   Diet: healthy diet   Exercise: 3-5 days per week. Review of Systems   Constitutional: Negative for fever, chills and fatigue. No distress.   HENT: Negative for hearing loss, congestion, sore throat, neck pain and dental problem • Breast Cancer Mother 36        Passed at 52   • Diabetes Father    • Hypertension Father    • Lipids Father    • Hypertension Maternal Grandmother    • Lipids Maternal Grandmother    • Diabetes Maternal Grandfather    • Colon Cancer Maternal Grandfather Immunization History  Administered            Date(s) Administered    >=3 YRS TRI  MULTIDOSE VIAL (07184) FLU CLINIC                          10/14/2014      FLU VAC QIV SPLIT 3 YRS AND OLDER (52896)                          10/03/2019      FLULAVAL 6 clara Class 1 obesity due to excess calories without serious comorbidity with body mass index (bmi) of 34.0 to 34.9 in adult  Well adult exam  (primary encounter diagnosis)  Elevated triglycerides with high cholesterol  Thyroid nodule  Need for influenza vaccina - Betamethasone Dipropionate Aug 0.05 % External Cream; Apply 1 Units topically 2 (two) times daily as needed (dry hands). Dispense: 50 g; Refill: 2    10.  Encounter for immunization  - TETANUS, DIPHTHERIA TOXOIDS AND ACELLULAR PERTUSIS VACCINE (TDAP), >7 Type 2 diabetes or prediabetes All women diagnosed with gestational diabetes Lifelong testing every 3 years   Type 2 diabetes All women with prediabetes Every year   Alcohol misuse All women in this age group At routine exams   Blood pressure All women in Hepatitis C Anyone at increased risk; 1 time for those born between [facility] At routine exams   High cholesterol or triglycerides All women ages 39 and older who are at risk for coronary artery disease; younger women, talk with your healthcare provide Pneumococcal conjugate vaccine (PCV13) and pneumococcal polysaccharide vaccine (PPSV23) Women at increased risk for infection–talk with your healthcare provider 1 or 2 doses   Tetanus/diphtheria/pertussis (Td/Tdap) booster All women in this age group A 1-t

## 2020-11-16 ENCOUNTER — OFFICE VISIT (OUTPATIENT)
Dept: INTERNAL MEDICINE CLINIC | Facility: CLINIC | Age: 50
End: 2020-11-16
Payer: COMMERCIAL

## 2020-11-16 VITALS
RESPIRATION RATE: 16 BRPM | WEIGHT: 198 LBS | BODY MASS INDEX: 33.8 KG/M2 | SYSTOLIC BLOOD PRESSURE: 110 MMHG | DIASTOLIC BLOOD PRESSURE: 70 MMHG | HEART RATE: 68 BPM | HEIGHT: 64 IN

## 2020-11-16 DIAGNOSIS — R00.2 PALPITATIONS: ICD-10-CM

## 2020-11-16 DIAGNOSIS — Z51.81 ENCOUNTER FOR THERAPEUTIC DRUG MONITORING: Primary | ICD-10-CM

## 2020-11-16 DIAGNOSIS — F41.9 ANXIETY: ICD-10-CM

## 2020-11-16 DIAGNOSIS — E78.2 MIXED HYPERLIPIDEMIA: ICD-10-CM

## 2020-11-16 DIAGNOSIS — F50.81 BINGE EATING DISORDER: ICD-10-CM

## 2020-11-16 DIAGNOSIS — E66.9 CLASS 1 OBESITY WITHOUT SERIOUS COMORBIDITY WITH BODY MASS INDEX (BMI) OF 33.0 TO 33.9 IN ADULT, UNSPECIFIED OBESITY TYPE: ICD-10-CM

## 2020-11-16 PROCEDURE — 99214 OFFICE O/P EST MOD 30 MIN: CPT | Performed by: PHYSICIAN ASSISTANT

## 2020-11-16 PROCEDURE — 3078F DIAST BP <80 MM HG: CPT | Performed by: PHYSICIAN ASSISTANT

## 2020-11-16 PROCEDURE — 3074F SYST BP LT 130 MM HG: CPT | Performed by: PHYSICIAN ASSISTANT

## 2020-11-16 PROCEDURE — 3008F BODY MASS INDEX DOCD: CPT | Performed by: PHYSICIAN ASSISTANT

## 2020-11-16 RX ORDER — DEXTROAMPHETAMINE SACCHARATE, AMPHETAMINE ASPARTATE MONOHYDRATE, DEXTROAMPHETAMINE SULFATE AND AMPHETAMINE SULFATE 2.5; 2.5; 2.5; 2.5 MG/1; MG/1; MG/1; MG/1
10 CAPSULE, EXTENDED RELEASE ORAL EVERY MORNING
Qty: 30 CAPSULE | Refills: 0 | Status: SHIPPED | OUTPATIENT
Start: 2020-11-16 | End: 2020-12-02

## 2020-11-16 RX ORDER — TOPIRAMATE 50 MG/1
TABLET, FILM COATED ORAL
Qty: 60 TABLET | Refills: 1 | Status: SHIPPED | OUTPATIENT
Start: 2020-11-16 | End: 2021-01-22

## 2020-11-16 NOTE — PROGRESS NOTES
HISTORY OF PRESENT ILLNESS  Patient presents with:  Weight Check: lost 1 pound      Bharathi Duran is a 52year old female here for follow up in medical weight loss program.   Down 1lb  Compliant on topamax  Insurance would not cover Vyvanse  Denies chest 10/16/2020    BUN 13 10/16/2020    BUNCREA 14.4 06/03/2019    CREATSERUM 0.84 10/16/2020    ANIONGAP 14 06/03/2019    GFR >59 02/28/2009    GFRNAA 82 10/16/2020    GFRAA 95 10/16/2020    CA 9.0 10/16/2020    OSMOCALC 286 06/03/2019    ALKPHO 59 10/16/2020 ASSESSMENT  Analyzed weight data:       Diagnoses and all orders for this visit:    Encounter for therapeutic drug monitoring    Class 1 obesity without serious comorbidity with body mass index (BMI) of 33.0 to 33.9 in adult, unspecified obesity type signed     There are no Patient Instructions on file for this visit. No follow-ups on file. Patient verbalizes understanding.     Lilia Guillen PA-C  11/16/2020

## 2020-11-16 NOTE — TELEPHONE ENCOUNTER
Requesting Topiramate 50 mg  LOV: 10/15/20  RTC: not noted  Last Relevant Labs: na  Filled: 9/17/20 #60 with 1 refills    Future Appointments   Date Time Provider Jamey Wagoner   11/16/2020  9:20 AM BETH Antoine [facility] [address]     [place]

## 2020-11-24 DIAGNOSIS — E78.2 MIXED DYSLIPIDEMIA: ICD-10-CM

## 2020-11-24 NOTE — TELEPHONE ENCOUNTER
Dr.Martens- Francois is requesting a refill.     Future Appointments   Date Time Provider Jamey Wagoner   12/14/2020 10:00 AM Octavio Eduardo PA-C [facility] [address] [facility] [address] AT [facility] [address]

## 2020-11-25 RX ORDER — ATORVASTATIN CALCIUM 10 MG/1
10 TABLET, FILM COATED ORAL DAILY
Qty: 90 TABLET | Refills: 0 | Status: SHIPPED | OUTPATIENT
Start: 2020-11-25 | End: 2020-12-31

## 2020-11-25 NOTE — TELEPHONE ENCOUNTER
Requested Prescriptions     Pending Prescriptions Disp Refills   • atorvastatin 10 MG Oral Tab 90 tablet 0     Sig: Take 1 tablet (10 mg total) by mouth daily.        LOV: 10/20/20 for annual physical  RTC: 3 months  Last Relevant Labs: 10/16/20  Filled: 10

## 2020-11-30 ENCOUNTER — TELEPHONE (OUTPATIENT)
Dept: INTERNAL MEDICINE CLINIC | Facility: CLINIC | Age: 50
End: 2020-11-30

## 2020-11-30 NOTE — TELEPHONE ENCOUNTER
Pt called left vmail states she is trying to get rx to go towards her deductible was told to call office for provider to call ins to get filled     Pt did not give name of rx - insurance has restriction and pt is unable to afford

## 2020-12-02 NOTE — TELEPHONE ENCOUNTER
Spoke with patient advised her she could use goodrx to get a lower cost for out of pocket , pt  Wants to go to [facility]. I will call and cancel rx at [facility] and have satnam send it over to [facility].

## 2020-12-04 RX ORDER — DEXTROAMPHETAMINE SACCHARATE, AMPHETAMINE ASPARTATE MONOHYDRATE, DEXTROAMPHETAMINE SULFATE AND AMPHETAMINE SULFATE 2.5; 2.5; 2.5; 2.5 MG/1; MG/1; MG/1; MG/1
10 CAPSULE, EXTENDED RELEASE ORAL EVERY MORNING
Qty: 30 CAPSULE | Refills: 0 | Status: SHIPPED | OUTPATIENT
Start: 2020-12-04 | End: 2021-01-04

## 2020-12-31 ENCOUNTER — PATIENT MESSAGE (OUTPATIENT)
Dept: INTERNAL MEDICINE CLINIC | Facility: CLINIC | Age: 50
End: 2020-12-31

## 2020-12-31 DIAGNOSIS — E78.2 MIXED DYSLIPIDEMIA: ICD-10-CM

## 2020-12-31 RX ORDER — ATORVASTATIN CALCIUM 10 MG/1
10 TABLET, FILM COATED ORAL DAILY
Qty: 90 TABLET | Refills: 1 | Status: SHIPPED | OUTPATIENT
Start: 2020-12-31 | End: 2021-03-22

## 2020-12-31 RX ORDER — DEXTROAMPHETAMINE SACCHARATE, AMPHETAMINE ASPARTATE MONOHYDRATE, DEXTROAMPHETAMINE SULFATE AND AMPHETAMINE SULFATE 2.5; 2.5; 2.5; 2.5 MG/1; MG/1; MG/1; MG/1
10 CAPSULE, EXTENDED RELEASE ORAL EVERY MORNING
Qty: 30 CAPSULE | Refills: 0 | Status: CANCELLED | OUTPATIENT
Start: 2020-12-31 | End: 2021-01-30

## 2020-12-31 NOTE — TELEPHONE ENCOUNTER
From: Adebayo Ron  To: Flaquita Looney PA-C  Sent: 12/31/2020 11:27 AM CST  Subject: Prescription Question    Hi Katherine Polanco. I need to get a refill on the Aderrall. I only have about 5 days left. I am not able to come for another appt.  until Feb. so I jus

## 2020-12-31 NOTE — TELEPHONE ENCOUNTER
Requesting Adderall XR 10 mg  LOV: 11/16/20  RTC: not noted  Last Relevant Labs: na  Filled: 12/4/20 #30 with 0 refills  Last [place]20 [place]    Future Appointments   Date Time Provider Jamey Wagoner   2/10/2021  7:40 AM Brenda Villasenor

## 2020-12-31 NOTE — TELEPHONE ENCOUNTER
Requested Prescriptions     atorvastatin 10 MG Oral Tab         Sig: Take 1 tablet (10 mg total) by mouth daily.     Disp:  90 tablet    Refills:  0    Start: 12/31/2020    Class: Normal    Non-formulary For: Mixed dyslipidemia    Last ordered: 1 month ago

## 2021-01-04 RX ORDER — DEXTROAMPHETAMINE SACCHARATE, AMPHETAMINE ASPARTATE MONOHYDRATE, DEXTROAMPHETAMINE SULFATE AND AMPHETAMINE SULFATE 2.5; 2.5; 2.5; 2.5 MG/1; MG/1; MG/1; MG/1
10 CAPSULE, EXTENDED RELEASE ORAL EVERY MORNING
Qty: 30 CAPSULE | Refills: 0 | Status: SHIPPED | OUTPATIENT
Start: 2021-01-04 | End: 2021-01-07

## 2021-01-07 RX ORDER — DEXTROAMPHETAMINE SACCHARATE, AMPHETAMINE ASPARTATE MONOHYDRATE, DEXTROAMPHETAMINE SULFATE AND AMPHETAMINE SULFATE 2.5; 2.5; 2.5; 2.5 MG/1; MG/1; MG/1; MG/1
10 CAPSULE, EXTENDED RELEASE ORAL EVERY MORNING
Qty: 30 CAPSULE | Refills: 0 | Status: SHIPPED | OUTPATIENT
Start: 2021-01-07 | End: 2021-02-06

## 2021-01-07 RX ORDER — DEXTROAMPHETAMINE SACCHARATE, AMPHETAMINE ASPARTATE MONOHYDRATE, DEXTROAMPHETAMINE SULFATE AND AMPHETAMINE SULFATE 2.5; 2.5; 2.5; 2.5 MG/1; MG/1; MG/1; MG/1
CAPSULE, EXTENDED RELEASE ORAL
Qty: 30 CAPSULE | Refills: 0 | OUTPATIENT
Start: 2021-01-07

## 2021-01-07 NOTE — TELEPHONE ENCOUNTER
Requesting Adderall XR 10 mg  LOV: 11/16/20  RTC: not noted  Last Relevant Labs: na  Filled: 1/4/21 #30 with 0 refills    Future Appointments   Date Time Provider Jamey Wagoner   1/22/2021  7:40 AM Marcell Rubin PA-C [facility] [address]   2/10/2

## 2021-01-07 NOTE — TELEPHONE ENCOUNTER
I am sorry. Adderall you signed on 1/4/21 was to go to [address]. I cannot call this in. Will you please sign this today. I pended it to Re in error. Sorry.

## 2021-01-07 NOTE — TELEPHONE ENCOUNTER
See my charts   Adderall was sent to [facility] and should have been [facility]. Blade Panchal approved Rx to [facility]. I  Called [facility] and cancelled order sent there earlier. Patient notified via my chart.

## 2021-01-22 ENCOUNTER — OFFICE VISIT (OUTPATIENT)
Dept: INTERNAL MEDICINE CLINIC | Facility: CLINIC | Age: 51
End: 2021-01-22
Payer: COMMERCIAL

## 2021-01-22 VITALS
RESPIRATION RATE: 16 BRPM | HEIGHT: 64 IN | DIASTOLIC BLOOD PRESSURE: 76 MMHG | SYSTOLIC BLOOD PRESSURE: 110 MMHG | BODY MASS INDEX: 33.46 KG/M2 | WEIGHT: 196 LBS | HEART RATE: 80 BPM

## 2021-01-22 DIAGNOSIS — F32.81 PMDD (PREMENSTRUAL DYSPHORIC DISORDER): ICD-10-CM

## 2021-01-22 DIAGNOSIS — E78.2 MIXED HYPERLIPIDEMIA: ICD-10-CM

## 2021-01-22 DIAGNOSIS — F50.81 BINGE EATING DISORDER: ICD-10-CM

## 2021-01-22 DIAGNOSIS — E66.9 CLASS 1 OBESITY WITHOUT SERIOUS COMORBIDITY WITH BODY MASS INDEX (BMI) OF 33.0 TO 33.9 IN ADULT, UNSPECIFIED OBESITY TYPE: ICD-10-CM

## 2021-01-22 DIAGNOSIS — Z51.81 ENCOUNTER FOR THERAPEUTIC DRUG MONITORING: Primary | ICD-10-CM

## 2021-01-22 PROCEDURE — 3078F DIAST BP <80 MM HG: CPT | Performed by: PHYSICIAN ASSISTANT

## 2021-01-22 PROCEDURE — 3008F BODY MASS INDEX DOCD: CPT | Performed by: PHYSICIAN ASSISTANT

## 2021-01-22 PROCEDURE — 99214 OFFICE O/P EST MOD 30 MIN: CPT | Performed by: PHYSICIAN ASSISTANT

## 2021-01-22 PROCEDURE — 3074F SYST BP LT 130 MM HG: CPT | Performed by: PHYSICIAN ASSISTANT

## 2021-01-22 RX ORDER — DEXTROAMPHETAMINE SACCHARATE, AMPHETAMINE ASPARTATE MONOHYDRATE, DEXTROAMPHETAMINE SULFATE AND AMPHETAMINE SULFATE 5; 5; 5; 5 MG/1; MG/1; MG/1; MG/1
20 CAPSULE, EXTENDED RELEASE ORAL EVERY MORNING
Qty: 30 CAPSULE | Refills: 0 | Status: SHIPPED | OUTPATIENT
Start: 2021-01-22 | End: 2021-02-21

## 2021-01-22 RX ORDER — TOPIRAMATE 50 MG/1
50 TABLET, FILM COATED ORAL 2 TIMES DAILY
Qty: 60 TABLET | Refills: 1 | Status: SHIPPED | OUTPATIENT
Start: 2021-01-22 | End: 2021-02-24

## 2021-01-22 RX ORDER — DEXTROAMPHETAMINE SACCHARATE, AMPHETAMINE ASPARTATE MONOHYDRATE, DEXTROAMPHETAMINE SULFATE AND AMPHETAMINE SULFATE 2.5; 2.5; 2.5; 2.5 MG/1; MG/1; MG/1; MG/1
10 CAPSULE, EXTENDED RELEASE ORAL EVERY MORNING
Qty: 30 CAPSULE | Refills: 0 | Status: CANCELLED | OUTPATIENT
Start: 2021-01-22 | End: 2021-02-21

## 2021-01-22 NOTE — PROGRESS NOTES
HISTORY OF PRESENT ILLNESS  Patient presents with:  Weight Check: down 2 lbs      Adebayo Ron is a 52year old female here for follow up in medical weight loss program.   Down 2lbs  Compliant on topamax and Adderall XR (off label)  Denies chest pain, sh RBC 4.79 06/03/2019    HGB 13.6 06/03/2019    HCT 41.6 06/03/2019    MCV 86.8 06/03/2019    MCH 28.4 06/03/2019    MCHC 32.7 06/03/2019    RDW 12.4 06/03/2019    .0 06/03/2019     Lab Results   Component Value Date     (H) 10/16/2020    BUN 1 mouth., Disp: , Rfl:     •  DAILY MULTIVITAMIN OR, 1 tablet daily, Disp: , Rfl:     No current facility-administered medications on file prior to visit.        ASSESSMENT  Analyzed weight data:       Diagnoses and all orders for this visit:    Encounter for for success. See patient instruction below for more details.   · Discussed strategies to overcome barriers to successful weight loss and weight maintenance  · FITTE: ACSM recommendations (150-300 minutes/ week in active weight loss)   · Weight Loss consent

## 2021-01-25 ENCOUNTER — TELEPHONE (OUTPATIENT)
Dept: FAMILY MEDICINE CLINIC | Facility: CLINIC | Age: 51
End: 2021-01-25

## 2021-01-25 DIAGNOSIS — F32.81 PMDD (PREMENSTRUAL DYSPHORIC DISORDER): ICD-10-CM

## 2021-01-25 DIAGNOSIS — Z51.81 ENCOUNTER FOR THERAPEUTIC DRUG MONITORING: ICD-10-CM

## 2021-01-25 DIAGNOSIS — F41.9 ANXIETY: ICD-10-CM

## 2021-01-25 RX ORDER — FLUOXETINE HYDROCHLORIDE 40 MG/1
40 CAPSULE ORAL DAILY
Qty: 90 CAPSULE | Refills: 0 | Status: CANCELLED | OUTPATIENT
Start: 2021-01-25

## 2021-01-25 RX ORDER — FLUOXETINE HYDROCHLORIDE 40 MG/1
40 CAPSULE ORAL DAILY
Qty: 90 CAPSULE | Refills: 0 | Status: SHIPPED | OUTPATIENT
Start: 2021-01-25 | End: 2021-03-22

## 2021-01-25 RX ORDER — FLUOXETINE HYDROCHLORIDE 40 MG/1
CAPSULE ORAL
Qty: 90 CAPSULE | Refills: 0 | OUTPATIENT
Start: 2021-01-25

## 2021-01-25 NOTE — TELEPHONE ENCOUNTER
Requesting FLUoxetine HCl 40 MG   LOV: 10/20/20  RTC:   Last Relevant Labs: 10/16/20  Filled: 10/13/20  # 90  with 0 refills    No future appointments.

## 2021-02-18 ENCOUNTER — PATIENT MESSAGE (OUTPATIENT)
Dept: FAMILY MEDICINE CLINIC | Facility: CLINIC | Age: 51
End: 2021-02-18

## 2021-02-19 NOTE — TELEPHONE ENCOUNTER
From: Renu Waller  To: Buck Carrillo MD  Sent: 2/18/2021 6:47 PM CST  Subject: Prescription Question    I just scheduled an appt for March 4th but I’m going to run out of my fluoxetine in about 4 days. Can I get a refill approved?

## 2021-02-24 ENCOUNTER — OFFICE VISIT (OUTPATIENT)
Dept: FAMILY MEDICINE CLINIC | Facility: CLINIC | Age: 51
End: 2021-02-24
Payer: COMMERCIAL

## 2021-02-24 VITALS
HEIGHT: 64 IN | BODY MASS INDEX: 32.82 KG/M2 | TEMPERATURE: 97 F | RESPIRATION RATE: 16 BRPM | DIASTOLIC BLOOD PRESSURE: 70 MMHG | HEART RATE: 98 BPM | WEIGHT: 192.25 LBS | SYSTOLIC BLOOD PRESSURE: 110 MMHG | OXYGEN SATURATION: 99 %

## 2021-02-24 DIAGNOSIS — Z51.81 ENCOUNTER FOR THERAPEUTIC DRUG MONITORING: Primary | ICD-10-CM

## 2021-02-24 DIAGNOSIS — F41.9 ANXIETY: ICD-10-CM

## 2021-02-24 DIAGNOSIS — E78.2 MIXED DYSLIPIDEMIA: ICD-10-CM

## 2021-02-24 DIAGNOSIS — E66.9 CLASS 1 OBESITY WITH SERIOUS COMORBIDITY AND BODY MASS INDEX (BMI) OF 33.0 TO 33.9 IN ADULT, UNSPECIFIED OBESITY TYPE: ICD-10-CM

## 2021-02-24 PROCEDURE — 99214 OFFICE O/P EST MOD 30 MIN: CPT | Performed by: PHYSICIAN ASSISTANT

## 2021-02-24 PROCEDURE — 3074F SYST BP LT 130 MM HG: CPT | Performed by: PHYSICIAN ASSISTANT

## 2021-02-24 PROCEDURE — 3078F DIAST BP <80 MM HG: CPT | Performed by: PHYSICIAN ASSISTANT

## 2021-02-24 PROCEDURE — 3008F BODY MASS INDEX DOCD: CPT | Performed by: PHYSICIAN ASSISTANT

## 2021-02-24 RX ORDER — DEXTROAMPHETAMINE SACCHARATE, AMPHETAMINE ASPARTATE MONOHYDRATE, DEXTROAMPHETAMINE SULFATE AND AMPHETAMINE SULFATE 5; 5; 5; 5 MG/1; MG/1; MG/1; MG/1
20 CAPSULE, EXTENDED RELEASE ORAL EVERY MORNING
COMMUNITY
End: 2021-03-05

## 2021-02-24 RX ORDER — TOPIRAMATE 50 MG/1
50 TABLET, FILM COATED ORAL 2 TIMES DAILY
Qty: 60 TABLET | Refills: 1 | Status: SHIPPED | OUTPATIENT
Start: 2021-02-24 | End: 2021-07-21

## 2021-02-24 RX ORDER — DEXTROAMPHETAMINE SACCHARATE, AMPHETAMINE ASPARTATE MONOHYDRATE, DEXTROAMPHETAMINE SULFATE AND AMPHETAMINE SULFATE 7.5; 7.5; 7.5; 7.5 MG/1; MG/1; MG/1; MG/1
30 CAPSULE, EXTENDED RELEASE ORAL EVERY MORNING
Qty: 30 CAPSULE | Refills: 0 | Status: SHIPPED | OUTPATIENT
Start: 2021-02-24 | End: 2021-03-22

## 2021-02-24 NOTE — PROGRESS NOTES
HISTORY OF PRESENT ILLNESS  Patient presents with:  Weight Check: down 4lb      Mark Haider is a 52year old female here for follow up in medical weight loss program.   Down 4lbs  Compliant on topamax and adderall  Denies chest pain, shortness of breath 86.8 06/03/2019    MCH 28.4 06/03/2019    MCHC 32.7 06/03/2019    RDW 12.4 06/03/2019    .0 06/03/2019     Lab Results   Component Value Date     (H) 10/16/2020    BUN 13 10/16/2020    BUNCREA 14.4 06/03/2019    CREATSERUM 0.84 10/16/2020 Disp: , Rfl:     No current facility-administered medications on file prior to visit.        ASSESSMENT  Analyzed weight data:       Diagnoses and all orders for this visit:    Encounter for therapeutic drug monitoring    Class 1 obesity with serious comorb well  · Weight Loss consent to treat reviewed and signed     Total time spent on chart review, pre-charting, obtaining history, counseling, and educating, reviewing labs was 35 minutes. There are no Patient Instructions on file for this visit.     No f

## 2021-03-05 ENCOUNTER — PATIENT MESSAGE (OUTPATIENT)
Dept: FAMILY MEDICINE CLINIC | Facility: CLINIC | Age: 51
End: 2021-03-05

## 2021-03-05 DIAGNOSIS — E78.2 MIXED DYSLIPIDEMIA: ICD-10-CM

## 2021-03-05 RX ORDER — DEXTROAMPHETAMINE SACCHARATE, AMPHETAMINE ASPARTATE MONOHYDRATE, DEXTROAMPHETAMINE SULFATE AND AMPHETAMINE SULFATE 5; 5; 5; 5 MG/1; MG/1; MG/1; MG/1
20 CAPSULE, EXTENDED RELEASE ORAL EVERY MORNING
Qty: 30 CAPSULE | Refills: 0 | Status: SHIPPED | OUTPATIENT
Start: 2021-03-05 | End: 2021-10-01

## 2021-03-05 RX ORDER — ATORVASTATIN CALCIUM 10 MG/1
10 TABLET, FILM COATED ORAL DAILY
Qty: 90 TABLET | Refills: 1 | Status: CANCELLED | OUTPATIENT
Start: 2021-03-05

## 2021-03-05 NOTE — TELEPHONE ENCOUNTER
atorvastatin 10 MG Oral Tab         Sig: Take 1 tablet (10 mg total) by mouth daily.     Disp:  90 tablet    Refills:  1    Start: 3/5/2021    Class: Normal    Non-formulary For: Mixed dyslipidemia    Last ordered: 2 months ago by Nicky Ospina MD

## 2021-03-05 NOTE — TELEPHONE ENCOUNTER
From: John Park  To: Blossom Gallagher PA-C  Sent: 3/5/2021 7:10 AM CST  Subject: Prescription Question    Hi Dr. Endy Bower. Can I get a refill on the 20 mg. Adderall?  ([facility]) I think the 30mg is making me a little jittery and causing headac

## 2021-03-22 ENCOUNTER — OFFICE VISIT (OUTPATIENT)
Dept: FAMILY MEDICINE CLINIC | Facility: CLINIC | Age: 51
End: 2021-03-22
Payer: COMMERCIAL

## 2021-03-22 VITALS
WEIGHT: 191 LBS | DIASTOLIC BLOOD PRESSURE: 74 MMHG | OXYGEN SATURATION: 99 % | TEMPERATURE: 98 F | HEART RATE: 74 BPM | SYSTOLIC BLOOD PRESSURE: 112 MMHG | RESPIRATION RATE: 16 BRPM | HEIGHT: 64 IN | BODY MASS INDEX: 32.61 KG/M2

## 2021-03-22 DIAGNOSIS — E78.2 MIXED DYSLIPIDEMIA: ICD-10-CM

## 2021-03-22 DIAGNOSIS — E04.1 THYROID NODULE: Primary | ICD-10-CM

## 2021-03-22 DIAGNOSIS — E78.2 ELEVATED TRIGLYCERIDES WITH HIGH CHOLESTEROL: ICD-10-CM

## 2021-03-22 DIAGNOSIS — F32.81 PMDD (PREMENSTRUAL DYSPHORIC DISORDER): ICD-10-CM

## 2021-03-22 DIAGNOSIS — Z51.81 ENCOUNTER FOR THERAPEUTIC DRUG MONITORING: ICD-10-CM

## 2021-03-22 DIAGNOSIS — F41.9 ANXIETY: ICD-10-CM

## 2021-03-22 DIAGNOSIS — Z00.00 LABORATORY EXAM ORDERED AS PART OF ROUTINE GENERAL MEDICAL EXAMINATION: ICD-10-CM

## 2021-03-22 PROCEDURE — 3074F SYST BP LT 130 MM HG: CPT | Performed by: FAMILY MEDICINE

## 2021-03-22 PROCEDURE — 99213 OFFICE O/P EST LOW 20 MIN: CPT | Performed by: FAMILY MEDICINE

## 2021-03-22 PROCEDURE — 3008F BODY MASS INDEX DOCD: CPT | Performed by: FAMILY MEDICINE

## 2021-03-22 PROCEDURE — 3078F DIAST BP <80 MM HG: CPT | Performed by: FAMILY MEDICINE

## 2021-03-22 RX ORDER — FLUOXETINE HYDROCHLORIDE 40 MG/1
40 CAPSULE ORAL DAILY
Qty: 30 CAPSULE | Refills: 0 | Status: SHIPPED | OUTPATIENT
Start: 2021-03-22 | End: 2021-05-05

## 2021-03-22 RX ORDER — ATORVASTATIN CALCIUM 10 MG/1
10 TABLET, FILM COATED ORAL DAILY
Qty: 30 TABLET | Refills: 0 | Status: SHIPPED | OUTPATIENT
Start: 2021-03-22 | End: 2021-05-05

## 2021-03-22 NOTE — PROGRESS NOTES
[facility] Visit Note  3/22/2021      Subjective:      Patient ID: Tera Earl is a 52year old female. Chief Complaint:  Patient presents with: Follow - Up: was told needed to be seen for med refill s on the Fluoxetine & Atorvastatin. daily.  Dispense: 30 capsule; Refill: 0    5. Thyroid nodule  - ASSAY, THYROID STIM HORMONE    6.  Elevated triglycerides with high cholesterol  - LIPID PANEL    7. Laboratory exam ordered as part of routine general medical examination  - CBC WITH DIFFERENT

## 2021-03-22 NOTE — PATIENT INSTRUCTIONS
Exercise: Adding Intensity  You have been exercising for 30 minutes most days of the week. Now you can move on to the next stage: increasing the intensity. This means doing your activity in one or more of these ways:  · Longer.  Exercise for 30 minutes or your healthcare professional's instructions. Exercises to Increase Agility: Figure 8s    This exercise helps to increase your ease and quickness of movement. It copies complex everyday movements.  This exercise was chosen for you based on the type of

## 2021-03-24 ENCOUNTER — OFFICE VISIT (OUTPATIENT)
Dept: FAMILY MEDICINE CLINIC | Facility: CLINIC | Age: 51
End: 2021-03-24
Payer: COMMERCIAL

## 2021-03-24 VITALS
RESPIRATION RATE: 16 BRPM | OXYGEN SATURATION: 99 % | HEIGHT: 64 IN | DIASTOLIC BLOOD PRESSURE: 70 MMHG | BODY MASS INDEX: 32.44 KG/M2 | HEART RATE: 76 BPM | SYSTOLIC BLOOD PRESSURE: 114 MMHG | TEMPERATURE: 98 F | WEIGHT: 190 LBS

## 2021-03-24 DIAGNOSIS — Z72.3 LACK OF EXERCISE: ICD-10-CM

## 2021-03-24 DIAGNOSIS — Z51.81 ENCOUNTER FOR THERAPEUTIC DRUG MONITORING: Primary | ICD-10-CM

## 2021-03-24 DIAGNOSIS — F32.81 PMDD (PREMENSTRUAL DYSPHORIC DISORDER): ICD-10-CM

## 2021-03-24 DIAGNOSIS — E78.2 MIXED DYSLIPIDEMIA: ICD-10-CM

## 2021-03-24 DIAGNOSIS — E66.9 CLASS 1 OBESITY WITHOUT SERIOUS COMORBIDITY WITH BODY MASS INDEX (BMI) OF 32.0 TO 32.9 IN ADULT, UNSPECIFIED OBESITY TYPE: ICD-10-CM

## 2021-03-24 PROCEDURE — 3074F SYST BP LT 130 MM HG: CPT | Performed by: PHYSICIAN ASSISTANT

## 2021-03-24 PROCEDURE — 3078F DIAST BP <80 MM HG: CPT | Performed by: PHYSICIAN ASSISTANT

## 2021-03-24 PROCEDURE — 3008F BODY MASS INDEX DOCD: CPT | Performed by: PHYSICIAN ASSISTANT

## 2021-03-24 PROCEDURE — 99214 OFFICE O/P EST MOD 30 MIN: CPT | Performed by: PHYSICIAN ASSISTANT

## 2021-03-24 RX ORDER — DEXTROAMPHETAMINE SACCHARATE, AMPHETAMINE ASPARTATE MONOHYDRATE, DEXTROAMPHETAMINE SULFATE AND AMPHETAMINE SULFATE 5; 5; 5; 5 MG/1; MG/1; MG/1; MG/1
20 CAPSULE, EXTENDED RELEASE ORAL DAILY
Qty: 30 CAPSULE | Refills: 0 | Status: CANCELLED | OUTPATIENT
Start: 2021-03-24 | End: 2021-04-23

## 2021-03-24 RX ORDER — DEXTROAMPHETAMINE SACCHARATE, AMPHETAMINE ASPARTATE MONOHYDRATE, DEXTROAMPHETAMINE SULFATE AND AMPHETAMINE SULFATE 5; 5; 5; 5 MG/1; MG/1; MG/1; MG/1
20 CAPSULE, EXTENDED RELEASE ORAL DAILY
Qty: 30 CAPSULE | Refills: 0 | Status: SHIPPED | OUTPATIENT
Start: 2021-04-24 | End: 2021-05-24

## 2021-03-24 RX ORDER — DEXTROAMPHETAMINE SACCHARATE, AMPHETAMINE ASPARTATE MONOHYDRATE, DEXTROAMPHETAMINE SULFATE AND AMPHETAMINE SULFATE 5; 5; 5; 5 MG/1; MG/1; MG/1; MG/1
20 CAPSULE, EXTENDED RELEASE ORAL DAILY
Qty: 30 CAPSULE | Refills: 0 | Status: SHIPPED | OUTPATIENT
Start: 2021-05-25 | End: 2021-06-24

## 2021-03-24 NOTE — PROGRESS NOTES
HISTORY OF PRESENT ILLNESS  Patient presents with:  Weight Check: adderall 20mg daily - she is doing well on medication. she is asking for refill.       Ivy Noemí is a 52year old female here for follow up in medical weight loss program.   Down 2lbs  C no clubbing, no edema    Lab Results   Component Value Date    WBC 9.2 06/03/2019    RBC 4.79 06/03/2019    HGB 13.6 06/03/2019    HCT 41.6 06/03/2019    MCV 86.8 06/03/2019    MCH 28.4 06/03/2019    MCHC 32.7 06/03/2019    RDW 12.4 06/03/2019    . 0 (dry hands). , Disp: 50 g, Rfl: 2  B Complex Vitamins (VITAMIN-B COMPLEX OR), Take  by mouth., Disp: , Rfl:   Calcium Carbonate-Vitamin D (CALCIUM 500 + D OR), Take  by mouth., Disp: , Rfl:   DAILY MULTIVITAMIN OR, 1 tablet daily, Disp: , Rfl:     No curren loss plan including attention to nutrition, exercise and behavior/stress management for success. See patient instruction below for more details.   · Discussed strategies to overcome barriers to successful weight loss and weight maintenance  · FITTE: ACSM re should include:                  Lose 1.5-2 lbs per week                Activity level: not very active (can't count exercise towards calorie number per day)                   ** Daily INPUT> Look at nutrition section-- \"nutrients\" and it will break down

## 2021-03-24 NOTE — PATIENT INSTRUCTIONS
Please try to work on the following dietary changes:  1. Goals: Aim for 20-30 grams of protein/ meal  i. Aim for 100 grams of carbohydrates/day  ii. Eat 4-6 vegetables/day  iii. Avoid skipping meals- eat every 4-5 hours  iv. Aim for 3 meals/day  2.  Drink [employer] for healthy recipe ideas  5. [employer] for low carb resources    HIGH PROTEIN SNACK IDEAS  -cottage cheese  -plain yogurt  -kefir  -hard-boiled eggs  -natural cheeses  -nuts (measure portion size)   -unsweetened nut butters  -dried ed

## 2021-04-07 RX ORDER — DEXTROAMPHETAMINE SACCHARATE, AMPHETAMINE ASPARTATE MONOHYDRATE, DEXTROAMPHETAMINE SULFATE AND AMPHETAMINE SULFATE 5; 5; 5; 5 MG/1; MG/1; MG/1; MG/1
20 CAPSULE, EXTENDED RELEASE ORAL DAILY
Qty: 30 CAPSULE | Refills: 0 | Status: SHIPPED | OUTPATIENT
Start: 2021-05-08 | End: 2021-06-07

## 2021-04-07 RX ORDER — DEXTROAMPHETAMINE SACCHARATE, AMPHETAMINE ASPARTATE MONOHYDRATE, DEXTROAMPHETAMINE SULFATE AND AMPHETAMINE SULFATE 5; 5; 5; 5 MG/1; MG/1; MG/1; MG/1
20 CAPSULE, EXTENDED RELEASE ORAL DAILY
Qty: 30 CAPSULE | Refills: 0 | Status: SHIPPED | OUTPATIENT
Start: 2021-04-07 | End: 2021-05-07

## 2021-04-07 NOTE — TELEPHONE ENCOUNTER
Patient has adderall 20 mg on file at [facility] but you have dated for 4/24/21 to fill. She got changed to 20 mg on 3/5/21 so she would like to fill - you have to redate the RX for [facility] to be able to release.

## 2021-05-05 DIAGNOSIS — Z51.81 ENCOUNTER FOR THERAPEUTIC DRUG MONITORING: ICD-10-CM

## 2021-05-05 DIAGNOSIS — E78.2 MIXED DYSLIPIDEMIA: ICD-10-CM

## 2021-05-05 DIAGNOSIS — F41.9 ANXIETY: ICD-10-CM

## 2021-05-05 DIAGNOSIS — F32.81 PMDD (PREMENSTRUAL DYSPHORIC DISORDER): ICD-10-CM

## 2021-05-05 NOTE — TELEPHONE ENCOUNTER
Requested Prescriptions     Pending Prescriptions Disp Refills   • ATORVASTATIN 10 MG Oral Tab [Pharmacy Med Name: ATORVASTATIN 10MG TABLETS] 30 tablet 0     Sig: TAKE 1 TABLET(10 MG) BY MOUTH DAILY   • FLUOXETINE HCL 40 MG Oral Cap [Pharmacy Med Name: FLU

## 2021-05-07 RX ORDER — ATORVASTATIN CALCIUM 10 MG/1
TABLET, FILM COATED ORAL
Qty: 90 TABLET | Refills: 1 | Status: SHIPPED | OUTPATIENT
Start: 2021-05-07 | End: 2021-08-18

## 2021-05-07 RX ORDER — FLUOXETINE HYDROCHLORIDE 40 MG/1
CAPSULE ORAL
Qty: 90 CAPSULE | Refills: 1 | Status: SHIPPED | OUTPATIENT
Start: 2021-05-07 | End: 2021-06-21

## 2021-05-10 ENCOUNTER — PATIENT MESSAGE (OUTPATIENT)
Dept: FAMILY MEDICINE CLINIC | Facility: CLINIC | Age: 51
End: 2021-05-10

## 2021-05-10 RX ORDER — DEXTROAMPHETAMINE SACCHARATE, AMPHETAMINE ASPARTATE MONOHYDRATE, DEXTROAMPHETAMINE SULFATE AND AMPHETAMINE SULFATE 5; 5; 5; 5 MG/1; MG/1; MG/1; MG/1
20 CAPSULE, EXTENDED RELEASE ORAL EVERY MORNING
Qty: 30 CAPSULE | Refills: 0 | OUTPATIENT
Start: 2021-05-10

## 2021-05-10 NOTE — TELEPHONE ENCOUNTER
From: Tera Earl  To: Jordan Greenberg PA-C  Sent: 5/10/2021 12:01 PM CDT  Subject: Prescription Question    Hi. I need a refill on my 20mg Adderall at [place]. They would only give me 1 month's worth when I last went to pick it up.  I only have 1 pill

## 2021-05-10 NOTE — TELEPHONE ENCOUNTER
Requested Prescriptions     Pending Prescriptions Disp Refills   • Amphetamine-Dextroamphet ER (ADDERALL XR) 20 MG Oral Capsule SR 24 Hr 30 capsule 0     Sig: Take 1 capsule (20 mg total) by mouth every morning.      LOV: 3/24/21 (satnam)  RTC:   Last Relev

## 2021-06-04 DIAGNOSIS — Z51.81 ENCOUNTER FOR THERAPEUTIC DRUG MONITORING: ICD-10-CM

## 2021-06-04 DIAGNOSIS — F41.9 ANXIETY: ICD-10-CM

## 2021-06-04 DIAGNOSIS — F32.81 PMDD (PREMENSTRUAL DYSPHORIC DISORDER): ICD-10-CM

## 2021-06-07 RX ORDER — FLUOXETINE HYDROCHLORIDE 40 MG/1
CAPSULE ORAL
Qty: 90 CAPSULE | Refills: 3 | OUTPATIENT
Start: 2021-06-07

## 2021-06-07 NOTE — TELEPHONE ENCOUNTER
FLUOXETINE  40MG  CAP          Will file in chart as: FLUOXETINE HCL 40 MG Oral Cap    Sig: TAKE 1 CAPSULE BY MOUTH  DAILY    Disp:  90 capsule    Refills:  3    Start: 6/4/2021    Class: Normal    Non-formulary For: Encounter for therapeutic drug monitori

## 2021-06-20 DIAGNOSIS — F41.9 ANXIETY: ICD-10-CM

## 2021-06-20 DIAGNOSIS — Z51.81 ENCOUNTER FOR THERAPEUTIC DRUG MONITORING: ICD-10-CM

## 2021-06-20 DIAGNOSIS — F32.81 PMDD (PREMENSTRUAL DYSPHORIC DISORDER): ICD-10-CM

## 2021-06-21 RX ORDER — FLUOXETINE HYDROCHLORIDE 40 MG/1
40 CAPSULE ORAL DAILY
Qty: 90 CAPSULE | Refills: 1 | Status: SHIPPED | OUTPATIENT
Start: 2021-06-21 | End: 2021-11-09

## 2021-07-16 ENCOUNTER — PATIENT MESSAGE (OUTPATIENT)
Dept: INTERNAL MEDICINE CLINIC | Facility: CLINIC | Age: 51
End: 2021-07-16

## 2021-07-16 NOTE — TELEPHONE ENCOUNTER
From: Farida Stacy  To: Boubacar Busch APRMIRTA  Sent: 7/16/2021 9:14 AM CDT  Subject: Prescription Question    Hi Dr. Donal Short. I need a refill on the Aderrall. I only have a few left and I am leaving on vacation next week.  Can I get 1 more refill before

## 2021-07-16 NOTE — TELEPHONE ENCOUNTER
Requesting Adderall  LOV: 3/24/21  RTC: not noted  Last Relevant Labs: na  Filled: 3/5/21 #30 with 0 refills last [place]10/21 [place]    No future appointments.   I sent a my chart telling patient she must be seen at least every 3 months

## 2021-07-21 ENCOUNTER — OFFICE VISIT (OUTPATIENT)
Dept: INTERNAL MEDICINE CLINIC | Facility: CLINIC | Age: 51
End: 2021-07-21
Payer: COMMERCIAL

## 2021-07-21 VITALS
DIASTOLIC BLOOD PRESSURE: 70 MMHG | RESPIRATION RATE: 16 BRPM | HEART RATE: 70 BPM | BODY MASS INDEX: 33.63 KG/M2 | SYSTOLIC BLOOD PRESSURE: 110 MMHG | WEIGHT: 197 LBS | HEIGHT: 64 IN

## 2021-07-21 DIAGNOSIS — F50.81 BINGE EATING DISORDER: ICD-10-CM

## 2021-07-21 DIAGNOSIS — E66.9 CLASS 1 OBESITY WITHOUT SERIOUS COMORBIDITY WITH BODY MASS INDEX (BMI) OF 33.0 TO 33.9 IN ADULT, UNSPECIFIED OBESITY TYPE: ICD-10-CM

## 2021-07-21 DIAGNOSIS — F41.9 ANXIETY: ICD-10-CM

## 2021-07-21 DIAGNOSIS — Z51.81 ENCOUNTER FOR THERAPEUTIC DRUG MONITORING: Primary | ICD-10-CM

## 2021-07-21 DIAGNOSIS — E78.2 MIXED HYPERLIPIDEMIA: ICD-10-CM

## 2021-07-21 PROCEDURE — 99214 OFFICE O/P EST MOD 30 MIN: CPT | Performed by: PHYSICIAN ASSISTANT

## 2021-07-21 PROCEDURE — 3008F BODY MASS INDEX DOCD: CPT | Performed by: PHYSICIAN ASSISTANT

## 2021-07-21 PROCEDURE — 3078F DIAST BP <80 MM HG: CPT | Performed by: PHYSICIAN ASSISTANT

## 2021-07-21 PROCEDURE — 3074F SYST BP LT 130 MM HG: CPT | Performed by: PHYSICIAN ASSISTANT

## 2021-07-21 RX ORDER — DEXTROAMPHETAMINE SACCHARATE, AMPHETAMINE ASPARTATE MONOHYDRATE, DEXTROAMPHETAMINE SULFATE AND AMPHETAMINE SULFATE 5; 5; 5; 5 MG/1; MG/1; MG/1; MG/1
20 CAPSULE, EXTENDED RELEASE ORAL DAILY
Qty: 30 CAPSULE | Refills: 0 | Status: SHIPPED | OUTPATIENT
Start: 2021-07-21 | End: 2021-08-20

## 2021-07-21 RX ORDER — TOPIRAMATE 50 MG/1
50 TABLET, FILM COATED ORAL 2 TIMES DAILY
Qty: 60 TABLET | Refills: 1 | Status: SHIPPED | OUTPATIENT
Start: 2021-07-21

## 2021-07-21 RX ORDER — SEMAGLUTIDE 0.5 MG/.5ML
0.5 INJECTION, SOLUTION SUBCUTANEOUS WEEKLY
Qty: 2 ML | Refills: 0 | Status: SHIPPED | OUTPATIENT
Start: 2021-07-21 | End: 2021-08-12

## 2021-07-21 RX ORDER — DEXTROAMPHETAMINE SACCHARATE, AMPHETAMINE ASPARTATE MONOHYDRATE, DEXTROAMPHETAMINE SULFATE AND AMPHETAMINE SULFATE 5; 5; 5; 5 MG/1; MG/1; MG/1; MG/1
20 CAPSULE, EXTENDED RELEASE ORAL DAILY
Qty: 30 CAPSULE | Refills: 0 | Status: SHIPPED | OUTPATIENT
Start: 2021-08-21 | End: 2021-09-20

## 2021-07-21 RX ORDER — DEXTROAMPHETAMINE SACCHARATE, AMPHETAMINE ASPARTATE MONOHYDRATE, DEXTROAMPHETAMINE SULFATE AND AMPHETAMINE SULFATE 5; 5; 5; 5 MG/1; MG/1; MG/1; MG/1
20 CAPSULE, EXTENDED RELEASE ORAL EVERY MORNING
Qty: 30 CAPSULE | Refills: 0 | Status: CANCELLED | OUTPATIENT
Start: 2021-07-21

## 2021-07-21 RX ORDER — DEXTROAMPHETAMINE SACCHARATE, AMPHETAMINE ASPARTATE MONOHYDRATE, DEXTROAMPHETAMINE SULFATE AND AMPHETAMINE SULFATE 5; 5; 5; 5 MG/1; MG/1; MG/1; MG/1
20 CAPSULE, EXTENDED RELEASE ORAL DAILY
Qty: 30 CAPSULE | Refills: 0 | Status: SHIPPED | OUTPATIENT
Start: 2021-09-21 | End: 2021-10-21

## 2021-07-21 NOTE — PATIENT INSTRUCTIONS
Lower Carb (40/40/20)   130g   protein     58g   fats     65g   carbs         Moderate Carb (30/35/35)   98g   protein     51g   fats     114g   carbs       Freshly  Clean [place]  Fit and [employer]  Low Carb Kitchen  Snap [employer]

## 2021-07-21 NOTE — PROGRESS NOTES
HISTORY OF PRESENT ILLNESS  Patient presents with:  Weight Check: UP 7 lbs       Farida Stacy is a 54year old female here for follow up in medical weight loss program.   Up 7lbs  Compliant on adderall and topamax  Denies chest pain, shortness of breath, 06/03/2019     Lab Results   Component Value Date     (H) 10/16/2020    BUN 13 10/16/2020    BUNCREA 14.4 06/03/2019    CREATSERUM 0.84 10/16/2020    ANIONGAP 14 06/03/2019    GFR >59 02/28/2009    GFRNAA 82 10/16/2020    GFRAA 95 10/16/2020    CA 9 all orders for this visit:    Encounter for therapeutic drug monitoring    Class 1 obesity without serious comorbidity with body mass index (BMI) of 33.0 to 33.9 in adult, unspecified obesity type    Binge eating disorder    Mixed hyperlipidemia    Anxiety stress in weight management. · Counseled on comprehensive weight loss plan including attention to nutrition, exercise and behavior/stress management for success. See patient instruction below for more details.   · Discussed strategies to overcome barriers

## 2021-08-18 ENCOUNTER — TELEPHONE (OUTPATIENT)
Dept: FAMILY MEDICINE CLINIC | Facility: CLINIC | Age: 51
End: 2021-08-18

## 2021-08-18 DIAGNOSIS — E04.1 THYROID NODULE: Primary | ICD-10-CM

## 2021-08-18 DIAGNOSIS — E78.2 MIXED DYSLIPIDEMIA: ICD-10-CM

## 2021-08-18 RX ORDER — ATORVASTATIN CALCIUM 10 MG/1
TABLET, FILM COATED ORAL
Qty: 90 TABLET | Refills: 0 | Status: SHIPPED | OUTPATIENT
Start: 2021-08-18 | End: 2021-11-15

## 2021-08-18 NOTE — TELEPHONE ENCOUNTER
Patient calling back, patient also requesting order for BL breast MRI due to hx. Spoke with OB/Gyne office, office stated might need order from PCP.

## 2021-08-18 NOTE — TELEPHONE ENCOUNTER
Spoke to patient. Has thyroid ultrasound completed last October at [facility]. Fax: [phone]. Needs another order placed and faxed to [facility]. Would like call back when faxed,       Order pended.  Please advise

## 2021-09-14 ENCOUNTER — PATIENT MESSAGE (OUTPATIENT)
Dept: INTERNAL MEDICINE CLINIC | Facility: CLINIC | Age: 51
End: 2021-09-14

## 2021-09-15 NOTE — TELEPHONE ENCOUNTER
From: Danyell Morgan  To: Rosario Marlow PA-C  Sent: 9/14/2021 3:17 PM CDT  Subject: Prescription    Hi. I just contacted the [facility][note 3] and they told me there is a nationwide shortage of [address]. I need my pack for month 3 (October).  What do I do

## 2021-09-16 RX ORDER — SEMAGLUTIDE 1 MG/.5ML
1 INJECTION, SOLUTION SUBCUTANEOUS WEEKLY
Qty: 2 ML | Refills: 0 | Status: SHIPPED | OUTPATIENT
Start: 2021-09-16 | End: 2021-10-08

## 2021-09-16 NOTE — TELEPHONE ENCOUNTER
I will send the 1mg dose to [facility] [address], she can also contact other pharmacy's in the area to see

## 2021-09-17 ENCOUNTER — HOSPITAL ENCOUNTER (OUTPATIENT)
Dept: ULTRASOUND IMAGING | Age: 51
Discharge: HOME | End: 2021-09-17
Attending: FAMILY MEDICINE
Payer: COMMERCIAL

## 2021-09-17 DIAGNOSIS — E04.1 THYROID NODULE: ICD-10-CM

## 2021-09-17 PROCEDURE — 76536 US EXAM OF HEAD AND NECK: CPT | Performed by: FAMILY MEDICINE

## 2021-09-29 RX ORDER — METFORMIN HYDROCHLORIDE 750 MG/1
750 TABLET, EXTENDED RELEASE ORAL DAILY
Qty: 30 TABLET | Refills: 0 | Status: SHIPPED | OUTPATIENT
Start: 2021-09-29 | End: 2021-10-26

## 2021-10-02 ENCOUNTER — OFFICE VISIT (OUTPATIENT)
Dept: INTERNAL MEDICINE CLINIC | Facility: CLINIC | Age: 51
End: 2021-10-02
Payer: COMMERCIAL

## 2021-10-02 VITALS
SYSTOLIC BLOOD PRESSURE: 110 MMHG | WEIGHT: 190 LBS | DIASTOLIC BLOOD PRESSURE: 76 MMHG | BODY MASS INDEX: 32.44 KG/M2 | HEIGHT: 64 IN | RESPIRATION RATE: 16 BRPM | HEART RATE: 70 BPM

## 2021-10-02 DIAGNOSIS — E66.9 CLASS 1 OBESITY WITHOUT SERIOUS COMORBIDITY WITH BODY MASS INDEX (BMI) OF 33.0 TO 33.9 IN ADULT, UNSPECIFIED OBESITY TYPE: ICD-10-CM

## 2021-10-02 DIAGNOSIS — F50.81 BINGE EATING DISORDER: ICD-10-CM

## 2021-10-02 DIAGNOSIS — F32.81 PMDD (PREMENSTRUAL DYSPHORIC DISORDER): ICD-10-CM

## 2021-10-02 DIAGNOSIS — E78.2 MIXED HYPERLIPIDEMIA: ICD-10-CM

## 2021-10-02 DIAGNOSIS — F41.9 ANXIETY: ICD-10-CM

## 2021-10-02 DIAGNOSIS — Z51.81 ENCOUNTER FOR THERAPEUTIC DRUG MONITORING: Primary | ICD-10-CM

## 2021-10-02 DIAGNOSIS — R73.09 ELEVATED GLUCOSE: ICD-10-CM

## 2021-10-02 PROCEDURE — 3008F BODY MASS INDEX DOCD: CPT | Performed by: PHYSICIAN ASSISTANT

## 2021-10-02 PROCEDURE — 3074F SYST BP LT 130 MM HG: CPT | Performed by: PHYSICIAN ASSISTANT

## 2021-10-02 PROCEDURE — 99214 OFFICE O/P EST MOD 30 MIN: CPT | Performed by: PHYSICIAN ASSISTANT

## 2021-10-02 PROCEDURE — 3078F DIAST BP <80 MM HG: CPT | Performed by: PHYSICIAN ASSISTANT

## 2021-10-02 NOTE — PROGRESS NOTES
HISTORY OF PRESENT ILLNESS  Patient presents with:  Weight Check: down 7 lbs      Ca Mosquera is a 54year old female here for follow up in medical weight loss program.   Down 7lbs  Stopped Adderall and Topamax when she started [facility]  Has not been able Date    WBC 9.2 06/03/2019    RBC 4.79 06/03/2019    HGB 13.6 06/03/2019    HCT 41.6 06/03/2019    MCV 86.8 06/03/2019    MCH 28.4 06/03/2019    MCHC 32.7 06/03/2019    RDW 12.4 06/03/2019    .0 06/03/2019     Lab Results   Component Value Date    G tablet daily, Disp: , Rfl:   semaglutide-weight management (WEGOVY) 1 MG/0.5ML Subcutaneous Solution Auto-injector, Inject 0.5 mL (1 mg total) into the skin once a week for 4 doses.  (Patient not taking: Reported on 10/2/2021), Disp: 2 mL, Rfl: 0  topiramat management. · Counseled on comprehensive weight loss plan including attention to nutrition, exercise and behavior/stress management for success. See patient instruction below for more details.   · Discussed strategies to overcome barriers to successful bjorn

## 2021-10-26 RX ORDER — METFORMIN HYDROCHLORIDE 750 MG/1
TABLET, EXTENDED RELEASE ORAL
Qty: 30 TABLET | Refills: 0 | Status: SHIPPED | OUTPATIENT
Start: 2021-10-26 | End: 2021-12-13

## 2021-10-26 NOTE — TELEPHONE ENCOUNTER
Requesting   Requested Prescriptions     Pending Prescriptions Disp Refills   • METFORMIN HCL  MG Oral Tablet 24 Hr [Pharmacy Med Name: METFORMIN ER 750MG 24HR TABS] 30 tablet 0     Sig: TAKE 1 TABLET(750 MG) BY MOUTH DAILY     LOV: 10/2/21  RTC: no

## 2021-11-08 DIAGNOSIS — F32.81 PMDD (PREMENSTRUAL DYSPHORIC DISORDER): ICD-10-CM

## 2021-11-08 DIAGNOSIS — Z51.81 ENCOUNTER FOR THERAPEUTIC DRUG MONITORING: ICD-10-CM

## 2021-11-08 DIAGNOSIS — F41.9 ANXIETY: ICD-10-CM

## 2021-11-09 RX ORDER — FLUOXETINE HYDROCHLORIDE 40 MG/1
CAPSULE ORAL
Qty: 90 CAPSULE | Refills: 3 | Status: SHIPPED | OUTPATIENT
Start: 2021-11-09

## 2021-11-13 DIAGNOSIS — E78.2 MIXED DYSLIPIDEMIA: ICD-10-CM

## 2021-11-15 RX ORDER — ATORVASTATIN CALCIUM 10 MG/1
TABLET, FILM COATED ORAL
Qty: 90 TABLET | Refills: 3 | Status: SHIPPED | OUTPATIENT
Start: 2021-11-15 | End: 2021-12-18

## 2021-11-15 NOTE — TELEPHONE ENCOUNTER
: Atorvastatin Calcium 10 MG Oral Tablet          Will file in chart as: ATORVASTATIN 10 MG Oral Tab    Sig: TAKE 1 TABLET BY MOUTH  DAILY    Disp:  90 tablet    Refills:  3    Start: 11/13/2021    Class: Normal    Non-formulary For: Mixed dyslipidemia

## 2021-11-16 ENCOUNTER — PATIENT MESSAGE (OUTPATIENT)
Dept: INTERNAL MEDICINE CLINIC | Facility: CLINIC | Age: 51
End: 2021-11-16

## 2021-11-16 NOTE — TELEPHONE ENCOUNTER
From: Tosha Gardner  To: Radha Ya PA-C  Sent: 11/16/2021 12:10 PM CST  Subject: Metformin vs. Wegovy    Hi. Are there any updates on the [facility] availability? The Metformin is really not working for me.  I have gained some pounds back and feeling m

## 2021-11-17 NOTE — TELEPHONE ENCOUNTER
See patient notes. Will you allow her to return to [facility]? I have asked her to schedule a f/u.

## 2021-11-18 RX ORDER — SEMAGLUTIDE 0.25 MG/.5ML
0.25 INJECTION, SOLUTION SUBCUTANEOUS WEEKLY
Qty: 2 ML | Refills: 0 | Status: SHIPPED | OUTPATIENT
Start: 2021-11-18 | End: 2021-12-10

## 2021-12-18 DIAGNOSIS — E78.2 MIXED DYSLIPIDEMIA: ICD-10-CM

## 2021-12-20 RX ORDER — ATORVASTATIN CALCIUM 10 MG/1
10 TABLET, FILM COATED ORAL DAILY
Qty: 90 TABLET | Refills: 3 | Status: SHIPPED | OUTPATIENT
Start: 2021-12-20

## 2021-12-20 NOTE — TELEPHONE ENCOUNTER
atorvastatin 10 MG Oral Tab          Sig: Take 1 tablet (10 mg total) by mouth daily.     Disp:  90 tablet    Refills:  3    Start: 12/18/2021    Class: Normal    Non-formulary For: Mixed dyslipidemia    To pharmacy: Requesting 1 year supply    Last ordered

## 2022-01-04 ENCOUNTER — TELEPHONE (OUTPATIENT)
Dept: INTERNAL MEDICINE CLINIC | Facility: CLINIC | Age: 52
End: 2022-01-04

## 2022-01-04 NOTE — TELEPHONE ENCOUNTER
Requesting Wegovy 0.5 mg  LOV: 10/2/21  RTC: not noted  Last Relevant Labs: na  Filled: 11/18/21 #2ml with 0 refills    Future Appointments   Date Time Provider Jamey Wagoner   1/5/2022  8:00 AM Mariella Carnes PA-C [address] [facility] [address]     Receive

## 2022-01-05 ENCOUNTER — OFFICE VISIT (OUTPATIENT)
Dept: INTERNAL MEDICINE CLINIC | Facility: CLINIC | Age: 52
End: 2022-01-05
Payer: COMMERCIAL

## 2022-01-05 VITALS
HEART RATE: 70 BPM | RESPIRATION RATE: 16 BRPM | BODY MASS INDEX: 33.8 KG/M2 | HEIGHT: 64 IN | SYSTOLIC BLOOD PRESSURE: 118 MMHG | DIASTOLIC BLOOD PRESSURE: 76 MMHG | WEIGHT: 198 LBS

## 2022-01-05 DIAGNOSIS — Z51.81 ENCOUNTER FOR THERAPEUTIC DRUG MONITORING: Primary | ICD-10-CM

## 2022-01-05 DIAGNOSIS — F50.81 BINGE EATING DISORDER: ICD-10-CM

## 2022-01-05 DIAGNOSIS — E66.9 CLASS 1 OBESITY WITHOUT SERIOUS COMORBIDITY WITH BODY MASS INDEX (BMI) OF 33.0 TO 33.9 IN ADULT, UNSPECIFIED OBESITY TYPE: ICD-10-CM

## 2022-01-05 DIAGNOSIS — E78.2 MIXED HYPERLIPIDEMIA: ICD-10-CM

## 2022-01-05 DIAGNOSIS — R73.09 ELEVATED GLUCOSE: ICD-10-CM

## 2022-01-05 PROCEDURE — 99214 OFFICE O/P EST MOD 30 MIN: CPT | Performed by: PHYSICIAN ASSISTANT

## 2022-01-05 PROCEDURE — 3008F BODY MASS INDEX DOCD: CPT | Performed by: PHYSICIAN ASSISTANT

## 2022-01-05 PROCEDURE — 3074F SYST BP LT 130 MM HG: CPT | Performed by: PHYSICIAN ASSISTANT

## 2022-01-05 PROCEDURE — 3078F DIAST BP <80 MM HG: CPT | Performed by: PHYSICIAN ASSISTANT

## 2022-01-05 RX ORDER — SEMAGLUTIDE 1 MG/.5ML
1 INJECTION, SOLUTION SUBCUTANEOUS WEEKLY
Qty: 2 ML | Refills: 0 | Status: SHIPPED | OUTPATIENT
Start: 2022-01-05 | End: 2022-01-27

## 2022-01-05 RX ORDER — SEMAGLUTIDE 0.5 MG/.5ML
0.5 INJECTION, SOLUTION SUBCUTANEOUS WEEKLY
Qty: 2 ML | Refills: 0 | Status: SHIPPED | OUTPATIENT
Start: 2022-01-05 | End: 2022-01-27

## 2022-01-05 NOTE — PROGRESS NOTES
HISTORY OF PRESENT ILLNESS  Patient presents with:  Weight Check: up 8 lbs      Adelfo Borrero is a 54year old female here for follow up in medical weight loss program.   Up 8lbs  Compliant on Wegovy  Denies chest pain, shortness of breath, dizziness, arabella MCHC 32.6 11/08/2021    RDW 12.2 11/08/2021     11/08/2021     Lab Results   Component Value Date    GLU 96 11/08/2021    BUN 13 11/08/2021    BUNCREA NOT APPLICABLE 59/82/7715    CREATSERUM 0.90 11/08/2021    ANIONGAP 14 06/03/2019    GFR >59 02/28 prior to visit.       ASSESSMENT  Analyzed weight data:       Diagnoses and all orders for this visit:    Encounter for therapeutic drug monitoring    Class 1 obesity without serious comorbidity with body mass index (BMI) of 33.0 to 33.9 in adult, unspecifi maintenance  · FITTE: ACSM recommendations (150-300 minutes/ week in active weight loss)   · Discussed ways to increase exercise throughout the day - 7 min workout joselyn, under desk cycle, treadmill desk  · Weight Loss consent to treat reviewed and signed

## 2022-01-11 RX ORDER — SEMAGLUTIDE 0.25 MG/.5ML
0.25 INJECTION, SOLUTION SUBCUTANEOUS WEEKLY
Qty: 2 ML | Refills: 0 | OUTPATIENT
Start: 2022-01-11 | End: 2022-02-02

## 2022-02-01 ENCOUNTER — TELEPHONE (OUTPATIENT)
Dept: INTERNAL MEDICINE CLINIC | Facility: CLINIC | Age: 52
End: 2022-02-01

## 2022-02-01 NOTE — TELEPHONE ENCOUNTER
[place] faxed request to do PA for Wegovy 1 mg   Cannot complete on CMM  Cannot reach pharmacy  My chart to patient for help

## 2022-02-04 ENCOUNTER — TELEPHONE (OUTPATIENT)
Dept: INTERNAL MEDICINE CLINIC | Facility: CLINIC | Age: 52
End: 2022-02-04

## 2022-02-04 NOTE — TELEPHONE ENCOUNTER
Faxed request from [facility] mail order to give Metformin  mg for 90 day  Last filled locally  My chart to patient to verify

## 2022-02-04 NOTE — TELEPHONE ENCOUNTER
PA needed for Wegovy 1 mg - faxed from [facility]  Form filled out  Need to attach last office note.   Diagnosis on form E66.01

## 2022-02-06 RX ORDER — METFORMIN HYDROCHLORIDE 750 MG/1
750 TABLET, EXTENDED RELEASE ORAL DAILY
Qty: 90 TABLET | Refills: 0 | Status: SHIPPED | OUTPATIENT
Start: 2022-02-06

## 2022-02-06 NOTE — TELEPHONE ENCOUNTER
Patient needs her metformin to go to mail order. Was approved for 2 months 12/13/21  She was last seen 1/5/22 and to return in 3 months. 90 day metformin  mg sent to [facility].

## 2022-02-08 RX ORDER — FLUOXETINE HYDROCHLORIDE 40 MG/1
40 CAPSULE ORAL DAILY
Qty: 90 CAPSULE | Refills: 0 | Status: SHIPPED | OUTPATIENT
Start: 2022-02-08

## 2022-02-12 RX ORDER — SEMAGLUTIDE 1 MG/.5ML
1 INJECTION, SOLUTION SUBCUTANEOUS WEEKLY
Qty: 2 ML | Refills: 0 | Status: SHIPPED | OUTPATIENT
Start: 2022-02-12 | End: 2022-03-06

## 2022-02-13 RX ORDER — SEMAGLUTIDE 1.7 MG/.75ML
1.7 INJECTION, SOLUTION SUBCUTANEOUS WEEKLY
Qty: 3 ML | Refills: 0 | Status: SHIPPED | OUTPATIENT
Start: 2022-02-13 | End: 2022-03-07

## 2022-02-13 NOTE — TELEPHONE ENCOUNTER
Requesting Wegovy 1.7 mg dose due  LOV: 1/5/22  RTC: 3 months  Last Relevant Labs: na  Filled: 2/12/22 #1 pen with 0 refills  1mg sent yesterday but she already has it.   Needs 1.7 mg    Future Appointments   Date Time Provider Jamey Wagoner   4/21/2022  8:40 AM CAROLYN [facility] 2 [facility]08 [facility]

## 2022-03-22 RX ORDER — ATORVASTATIN CALCIUM 10 MG/1
10 TABLET, FILM COATED ORAL DAILY
Qty: 90 TABLET | Refills: 3 | Status: CANCELLED | OUTPATIENT
Start: 2022-03-22

## 2022-04-06 ENCOUNTER — PATIENT MESSAGE (OUTPATIENT)
Dept: INTERNAL MEDICINE CLINIC | Facility: CLINIC | Age: 52
End: 2022-04-06

## 2022-04-06 NOTE — TELEPHONE ENCOUNTER
From: Juni Rai  To: Dionne Delong PA-C  Sent: 4/6/2022 1:48 PM CDT  Subject: Wegovy 2.4    Hi. I need to get my next refill for Wegovy 2.4 at the [facility]. Would it be possible to fill it at [facility] on [address]? Or [facility] on [address], Or can I only get it at the [facility]? I was out of town last week and just got home last night after a canceled flight, so I just took my last injection of the 1.7 dose last night (2 nights late). Do I still qualify for the $25/mo. deal on the [facility]? I believe it was good for 6 refills. Is there anything else I can take in substitution of the [facility] since I cannot afford $800+ for a continuing prescription?     Thanks,  Shaquille Naqvi

## 2022-04-07 NOTE — TELEPHONE ENCOUNTER
Requesting wegovy 2.4 mg requested  LOV: 1/5/22  RTC: 3 months  Last Relevant Labs: na  Filled: 2/13/22 #3ml with 0 refills  WEgovy 1.7 mg    Future Appointments   Date Time Provider Jamey Wagoner   4/20/2022  1:40 PM Eveline Larsen PA-C [address] [facility] [address]

## 2022-04-08 RX ORDER — SEMAGLUTIDE 2.4 MG/.75ML
2.4 INJECTION, SOLUTION SUBCUTANEOUS WEEKLY
Qty: 3 ML | Refills: 0 | Status: SHIPPED | OUTPATIENT
Start: 2022-04-08 | End: 2022-04-30

## 2022-04-11 ENCOUNTER — TELEPHONE (OUTPATIENT)
Dept: INTERNAL MEDICINE CLINIC | Facility: CLINIC | Age: 52
End: 2022-04-11

## 2022-04-20 ENCOUNTER — OFFICE VISIT (OUTPATIENT)
Dept: INTERNAL MEDICINE CLINIC | Facility: CLINIC | Age: 52
End: 2022-04-20
Payer: COMMERCIAL

## 2022-04-20 VITALS
RESPIRATION RATE: 16 BRPM | HEART RATE: 82 BPM | SYSTOLIC BLOOD PRESSURE: 118 MMHG | DIASTOLIC BLOOD PRESSURE: 80 MMHG | HEIGHT: 64 IN | WEIGHT: 183 LBS | BODY MASS INDEX: 31.24 KG/M2

## 2022-04-20 DIAGNOSIS — E78.2 MIXED HYPERLIPIDEMIA: ICD-10-CM

## 2022-04-20 DIAGNOSIS — E66.9 CLASS 1 OBESITY WITHOUT SERIOUS COMORBIDITY WITH BODY MASS INDEX (BMI) OF 33.0 TO 33.9 IN ADULT, UNSPECIFIED OBESITY TYPE: ICD-10-CM

## 2022-04-20 DIAGNOSIS — Z51.81 ENCOUNTER FOR THERAPEUTIC DRUG MONITORING: Primary | ICD-10-CM

## 2022-04-20 DIAGNOSIS — F50.81 BINGE EATING DISORDER: ICD-10-CM

## 2022-04-20 PROCEDURE — 3008F BODY MASS INDEX DOCD: CPT | Performed by: PHYSICIAN ASSISTANT

## 2022-04-20 PROCEDURE — 3074F SYST BP LT 130 MM HG: CPT | Performed by: PHYSICIAN ASSISTANT

## 2022-04-20 PROCEDURE — 3079F DIAST BP 80-89 MM HG: CPT | Performed by: PHYSICIAN ASSISTANT

## 2022-04-20 PROCEDURE — 99213 OFFICE O/P EST LOW 20 MIN: CPT | Performed by: PHYSICIAN ASSISTANT

## 2022-04-20 RX ORDER — SEMAGLUTIDE 2.4 MG/.75ML
2.4 INJECTION, SOLUTION SUBCUTANEOUS WEEKLY
Qty: 3 ML | Refills: 0 | Status: SHIPPED | OUTPATIENT
Start: 2022-04-20 | End: 2022-05-12

## 2022-04-20 RX ORDER — METFORMIN HYDROCHLORIDE 750 MG/1
750 TABLET, EXTENDED RELEASE ORAL DAILY
Qty: 90 TABLET | Refills: 0 | Status: SHIPPED | OUTPATIENT
Start: 2022-04-20

## 2022-04-21 RX ORDER — METFORMIN HYDROCHLORIDE 750 MG/1
TABLET, EXTENDED RELEASE ORAL
Qty: 90 TABLET | Refills: 0 | OUTPATIENT
Start: 2022-04-21

## 2022-04-28 RX ORDER — FLUOXETINE HYDROCHLORIDE 40 MG/1
40 CAPSULE ORAL DAILY
Qty: 90 CAPSULE | Refills: 0 | Status: SHIPPED | OUTPATIENT
Start: 2022-04-28

## 2022-04-28 NOTE — TELEPHONE ENCOUNTER
Future Appointments   Date Time Provider Jamey Wagoner   6/9/2022  7:00 AM Chandra iMranda MD [facility] [address] [facility] [address]

## 2022-05-04 DIAGNOSIS — R73.09 ELEVATED GLUCOSE: Primary | ICD-10-CM

## 2022-05-04 DIAGNOSIS — R73.01 IFG (IMPAIRED FASTING GLUCOSE): ICD-10-CM

## 2022-05-04 NOTE — TELEPHONE ENCOUNTER
PA needed for [facility] 1MG,    Patient ID [id number]    PA # TO call- [phone]

## 2022-05-05 NOTE — TELEPHONE ENCOUNTER
Patient was given Ozempic.   I need to verify diagnosis as A1c listed is normal.  No diagnosis on chart of prediabetes etc.      Class 1 obesity without serious comorbidity with body mass index (BMI) of 33.0 to 33.9 in adult, unspecified obesity type  E66.9, Z68.33    Binge eating disorder  F50.81    Mixed hyperlipidemia  E78.2

## 2022-05-10 NOTE — TELEPHONE ENCOUNTER
IFG   R73.01  Increased glucose R73.09  Cardiac risk Z91.89    On metformin  I called and spoke with Eugene Ricardo in customer care - she does not do PAs - not sure why I was transferred there. Need to try again later.

## 2022-05-10 NOTE — TELEPHONE ENCOUNTER
Form received from [facility] for PA for Ozempic 1 mg  Filled out and need to fax back with last notes to [phone]

## 2022-05-11 NOTE — TELEPHONE ENCOUNTER
Another form received asking if patient has DM - the answer is no and that is the only question I have to answer - will likely be denied.   Faxed back

## 2022-05-12 NOTE — TELEPHONE ENCOUNTER
Deepa Wiseman is not covered because patient does not have DM  How do you want to proceed?     My chart sent to patient

## 2022-05-13 RX ORDER — SEMAGLUTIDE 1.7 MG/.75ML
1.7 INJECTION, SOLUTION SUBCUTANEOUS WEEKLY
Qty: 3 ML | Refills: 0 | Status: SHIPPED | OUTPATIENT
Start: 2022-05-13 | End: 2022-05-16 | Stop reason: DRUGHIGH

## 2022-05-13 NOTE — TELEPHONE ENCOUNTER
Patient would like to try and get wegovy again with coupon if it will work since ozempic denied.   She would like 1.7 mg

## 2022-05-16 RX ORDER — SEMAGLUTIDE 2.4 MG/.75ML
2.4 INJECTION, SOLUTION SUBCUTANEOUS WEEKLY
Qty: 3 ML | Refills: 0 | Status: SHIPPED | OUTPATIENT
Start: 2022-05-16 | End: 2022-06-07

## 2022-05-16 RX ORDER — SEMAGLUTIDE 2.4 MG/.75ML
2.4 INJECTION, SOLUTION SUBCUTANEOUS WEEKLY
Qty: 3 ML | Refills: 0 | Status: SHIPPED | OUTPATIENT
Start: 2022-05-16 | End: 2022-05-16

## 2022-05-16 NOTE — TELEPHONE ENCOUNTER
Requesting WEGOVY 2.4MG   LOV: 4/20/22  RTC: NOT NOTED   Last Relevant Labs: NA   Filled:3MLon 4/20/22   with 0 refills( 2.4MG)    Future Appointments   Date Time Provider Jamey Rizwana   6/9/2022  7:00 AM Sarah Marlow MD [facility] [address] [facility] [address]       Patient is unable to find 1.7mg dose currently from last refill, so she wants to go back to 2.4mg dose wegovy since patient has tolerated it in the past.

## 2022-05-16 NOTE — TELEPHONE ENCOUNTER
Patient was just sent 1.7 mg but has not gone 2 weeks without Wegovy 2.4 mg  Warned against getting sick if longer without. Patient aware.   RX sent

## 2022-06-09 ENCOUNTER — OFFICE VISIT (OUTPATIENT)
Dept: FAMILY MEDICINE CLINIC | Facility: CLINIC | Age: 52
End: 2022-06-09
Payer: COMMERCIAL

## 2022-06-09 VITALS
DIASTOLIC BLOOD PRESSURE: 70 MMHG | HEIGHT: 64 IN | TEMPERATURE: 98 F | RESPIRATION RATE: 16 BRPM | HEART RATE: 91 BPM | WEIGHT: 175 LBS | SYSTOLIC BLOOD PRESSURE: 110 MMHG | BODY MASS INDEX: 29.88 KG/M2 | OXYGEN SATURATION: 98 %

## 2022-06-09 DIAGNOSIS — F41.9 ANXIETY: ICD-10-CM

## 2022-06-09 DIAGNOSIS — Z00.00 ROUTINE MEDICAL EXAM: Primary | ICD-10-CM

## 2022-06-09 DIAGNOSIS — Z00.00 LABORATORY EXAM ORDERED AS PART OF ROUTINE GENERAL MEDICAL EXAMINATION: ICD-10-CM

## 2022-06-09 DIAGNOSIS — F32.81 PMDD (PREMENSTRUAL DYSPHORIC DISORDER): ICD-10-CM

## 2022-06-09 DIAGNOSIS — E04.1 THYROID NODULE: ICD-10-CM

## 2022-06-09 PROCEDURE — 3078F DIAST BP <80 MM HG: CPT | Performed by: FAMILY MEDICINE

## 2022-06-09 PROCEDURE — 3008F BODY MASS INDEX DOCD: CPT | Performed by: FAMILY MEDICINE

## 2022-06-09 PROCEDURE — 3074F SYST BP LT 130 MM HG: CPT | Performed by: FAMILY MEDICINE

## 2022-06-09 PROCEDURE — 99396 PREV VISIT EST AGE 40-64: CPT | Performed by: FAMILY MEDICINE

## 2022-06-09 RX ORDER — SEMAGLUTIDE 1.7 MG/.75ML
INJECTION, SOLUTION SUBCUTANEOUS
COMMUNITY
Start: 2022-05-16

## 2022-06-09 RX ORDER — FLUOXETINE HYDROCHLORIDE 40 MG/1
40 CAPSULE ORAL DAILY
Qty: 90 CAPSULE | Refills: 3 | Status: CANCELLED | OUTPATIENT
Start: 2022-06-09

## 2022-06-13 RX ORDER — SEMAGLUTIDE 1.7 MG/.75ML
INJECTION, SOLUTION SUBCUTANEOUS
Qty: 3 ML | Refills: 0 | Status: SHIPPED | OUTPATIENT
Start: 2022-06-13

## 2022-06-13 RX ORDER — SEMAGLUTIDE 2.4 MG/.75ML
2.4 INJECTION, SOLUTION SUBCUTANEOUS WEEKLY
Qty: 3 ML | Refills: 0 | Status: SHIPPED | OUTPATIENT
Start: 2022-06-13 | End: 2022-07-05

## 2022-06-14 RX ORDER — SEMAGLUTIDE 1.7 MG/.75ML
INJECTION, SOLUTION SUBCUTANEOUS
Qty: 3 ML | Refills: 0 | OUTPATIENT
Start: 2022-06-14

## 2022-07-21 RX ORDER — METFORMIN HYDROCHLORIDE 750 MG/1
TABLET, EXTENDED RELEASE ORAL
Qty: 90 TABLET | Refills: 0 | Status: SHIPPED | OUTPATIENT
Start: 2022-07-21

## 2022-08-31 ENCOUNTER — OFFICE VISIT (OUTPATIENT)
Dept: INTERNAL MEDICINE CLINIC | Facility: CLINIC | Age: 52
End: 2022-08-31
Payer: COMMERCIAL

## 2022-08-31 VITALS
RESPIRATION RATE: 16 BRPM | SYSTOLIC BLOOD PRESSURE: 116 MMHG | HEART RATE: 86 BPM | WEIGHT: 183 LBS | BODY MASS INDEX: 31.24 KG/M2 | HEIGHT: 64 IN | DIASTOLIC BLOOD PRESSURE: 76 MMHG

## 2022-08-31 DIAGNOSIS — E78.2 MIXED HYPERLIPIDEMIA: ICD-10-CM

## 2022-08-31 DIAGNOSIS — E66.9 CLASS 1 OBESITY WITH SERIOUS COMORBIDITY AND BODY MASS INDEX (BMI) OF 31.0 TO 31.9 IN ADULT, UNSPECIFIED OBESITY TYPE: ICD-10-CM

## 2022-08-31 DIAGNOSIS — R73.01 IFG (IMPAIRED FASTING GLUCOSE): ICD-10-CM

## 2022-08-31 DIAGNOSIS — Z51.81 ENCOUNTER FOR THERAPEUTIC DRUG MONITORING: Primary | ICD-10-CM

## 2022-08-31 PROCEDURE — 99213 OFFICE O/P EST LOW 20 MIN: CPT | Performed by: PHYSICIAN ASSISTANT

## 2022-08-31 PROCEDURE — 3078F DIAST BP <80 MM HG: CPT | Performed by: PHYSICIAN ASSISTANT

## 2022-08-31 PROCEDURE — 3008F BODY MASS INDEX DOCD: CPT | Performed by: PHYSICIAN ASSISTANT

## 2022-08-31 PROCEDURE — 3074F SYST BP LT 130 MM HG: CPT | Performed by: PHYSICIAN ASSISTANT

## 2022-08-31 RX ORDER — TIRZEPATIDE 2.5 MG/.5ML
2.5 INJECTION, SOLUTION SUBCUTANEOUS WEEKLY
Qty: 2 ML | Refills: 0 | Status: SHIPPED | OUTPATIENT
Start: 2022-08-31 | End: 2022-08-31 | Stop reason: CLARIF

## 2022-08-31 RX ORDER — TIRZEPATIDE 2.5 MG/.5ML
2.5 INJECTION, SOLUTION SUBCUTANEOUS WEEKLY
Qty: 2 ML | Refills: 0 | Status: SHIPPED | OUTPATIENT
Start: 2022-08-31

## 2022-09-07 ENCOUNTER — OFFICE VISIT (OUTPATIENT)
Dept: FAMILY MEDICINE CLINIC | Facility: CLINIC | Age: 52
End: 2022-09-07
Payer: COMMERCIAL

## 2022-09-07 VITALS
SYSTOLIC BLOOD PRESSURE: 104 MMHG | RESPIRATION RATE: 20 BRPM | WEIGHT: 180 LBS | BODY MASS INDEX: 30.73 KG/M2 | HEIGHT: 64 IN | TEMPERATURE: 97 F | HEART RATE: 78 BPM | OXYGEN SATURATION: 99 % | DIASTOLIC BLOOD PRESSURE: 64 MMHG

## 2022-09-07 DIAGNOSIS — H66.92 LEFT ACUTE OTITIS MEDIA: ICD-10-CM

## 2022-09-07 DIAGNOSIS — J01.00 ACUTE MAXILLARY SINUSITIS, RECURRENCE NOT SPECIFIED: Primary | ICD-10-CM

## 2022-09-07 PROCEDURE — 3008F BODY MASS INDEX DOCD: CPT | Performed by: NURSE PRACTITIONER

## 2022-09-07 PROCEDURE — 3074F SYST BP LT 130 MM HG: CPT | Performed by: NURSE PRACTITIONER

## 2022-09-07 PROCEDURE — 99213 OFFICE O/P EST LOW 20 MIN: CPT | Performed by: NURSE PRACTITIONER

## 2022-09-07 PROCEDURE — 3078F DIAST BP <80 MM HG: CPT | Performed by: NURSE PRACTITIONER

## 2022-09-07 RX ORDER — FLUTICASONE PROPIONATE 50 MCG
1 SPRAY, SUSPENSION (ML) NASAL 2 TIMES DAILY
Qty: 1 EACH | Refills: 2 | Status: SHIPPED | OUTPATIENT
Start: 2022-09-07 | End: 2022-10-07

## 2022-09-07 RX ORDER — DOXYCYCLINE HYCLATE 100 MG/1
100 CAPSULE ORAL 2 TIMES DAILY
Qty: 14 CAPSULE | Refills: 0 | Status: SHIPPED | OUTPATIENT
Start: 2022-09-07 | End: 2022-09-14

## 2022-09-12 ENCOUNTER — TELEPHONE (OUTPATIENT)
Dept: FAMILY MEDICINE CLINIC | Facility: CLINIC | Age: 52
End: 2022-09-12

## 2022-09-12 NOTE — TELEPHONE ENCOUNTER
Patient calling, patient went to [facility] on 9-7, was prescribed Doxycycline and ear drops. Has 1 day left of treatment, states symptoms still the same-no improvement.  Please advise

## 2022-09-12 NOTE — TELEPHONE ENCOUNTER
Patient needs appt with PCP- Can use video appt tomorrow or she has openings on 9/14/22 and SDA slots

## 2022-09-13 NOTE — TELEPHONE ENCOUNTER
Future Appointments   Date Time Provider Jamey Wagoner   9/14/2022 10:40 AM Tray Gomez MD [facility] [address] [facility] [address]   10/7/2022  2:00 PM PF [facility]

## 2022-09-14 ENCOUNTER — TELEMEDICINE (OUTPATIENT)
Dept: FAMILY MEDICINE CLINIC | Facility: CLINIC | Age: 52
End: 2022-09-14

## 2022-09-14 DIAGNOSIS — H69.83 EUSTACHIAN TUBE DYSFUNCTION, BILATERAL: Primary | ICD-10-CM

## 2022-09-14 PROCEDURE — 99213 OFFICE O/P EST LOW 20 MIN: CPT | Performed by: FAMILY MEDICINE

## 2022-09-14 RX ORDER — METHYLPREDNISOLONE 4 MG/1
TABLET ORAL
Qty: 1 EACH | Refills: 0 | Status: SHIPPED | OUTPATIENT
Start: 2022-09-14

## 2022-09-21 ENCOUNTER — OFFICE VISIT (OUTPATIENT)
Dept: FAMILY MEDICINE CLINIC | Facility: CLINIC | Age: 52
End: 2022-09-21

## 2022-09-21 VITALS
BODY MASS INDEX: 31.41 KG/M2 | RESPIRATION RATE: 16 BRPM | HEIGHT: 64 IN | WEIGHT: 184 LBS | HEART RATE: 96 BPM | OXYGEN SATURATION: 99 % | TEMPERATURE: 98 F | DIASTOLIC BLOOD PRESSURE: 70 MMHG | SYSTOLIC BLOOD PRESSURE: 110 MMHG

## 2022-09-21 DIAGNOSIS — G93.3 POSTVIRAL FATIGUE SYNDROME: ICD-10-CM

## 2022-09-21 DIAGNOSIS — R05.1 ACUTE COUGH: ICD-10-CM

## 2022-09-21 DIAGNOSIS — J01.41 ACUTE RECURRENT PANSINUSITIS: Primary | ICD-10-CM

## 2022-09-21 DIAGNOSIS — R51.9 ACUTE NONINTRACTABLE HEADACHE, UNSPECIFIED HEADACHE TYPE: ICD-10-CM

## 2022-09-21 PROCEDURE — 99213 OFFICE O/P EST LOW 20 MIN: CPT | Performed by: FAMILY MEDICINE

## 2022-09-21 PROCEDURE — 3078F DIAST BP <80 MM HG: CPT | Performed by: FAMILY MEDICINE

## 2022-09-21 PROCEDURE — 3074F SYST BP LT 130 MM HG: CPT | Performed by: FAMILY MEDICINE

## 2022-09-21 PROCEDURE — 3008F BODY MASS INDEX DOCD: CPT | Performed by: FAMILY MEDICINE

## 2022-09-21 PROCEDURE — 87637 SARSCOV2&INF A&B&RSV AMP PRB: CPT | Performed by: FAMILY MEDICINE

## 2022-09-21 RX ORDER — PREDNISONE 10 MG/1
20 TABLET ORAL DAILY
Qty: 10 TABLET | Refills: 0 | Status: SHIPPED | OUTPATIENT
Start: 2022-09-21 | End: 2022-09-26

## 2022-09-21 RX ORDER — AMOXICILLIN AND CLAVULANATE POTASSIUM 875; 125 MG/1; MG/1
1 TABLET, FILM COATED ORAL 2 TIMES DAILY
Qty: 20 TABLET | Refills: 0 | Status: SHIPPED | OUTPATIENT
Start: 2022-09-21 | End: 2022-10-01

## 2022-09-22 ENCOUNTER — TELEPHONE (OUTPATIENT)
Dept: FAMILY MEDICINE CLINIC | Facility: CLINIC | Age: 52
End: 2022-09-22

## 2022-09-22 LAB
FLUAV + FLUBV RNA SPEC NAA+PROBE: NOT DETECTED
FLUAV + FLUBV RNA SPEC NAA+PROBE: NOT DETECTED
RSV RNA SPEC NAA+PROBE: NOT DETECTED
SARS-COV-2 RNA RESP QL NAA+PROBE: DETECTED

## 2022-09-22 NOTE — TELEPHONE ENCOUNTER
Pt tested positive for covid. Pt would like to know guidelines and any medication for this. Pt would like to discuss timeframe as well.

## 2022-10-05 ENCOUNTER — TELEPHONE (OUTPATIENT)
Dept: FAMILY MEDICINE CLINIC | Facility: CLINIC | Age: 52
End: 2022-10-05

## 2022-10-05 NOTE — TELEPHONE ENCOUNTER
- [facility] mail Service has faxed a request for Atorvastatin Tab 10mg  Qty 90 days.

## 2022-10-07 ENCOUNTER — HOSPITAL ENCOUNTER (OUTPATIENT)
Dept: ULTRASOUND IMAGING | Age: 52
Discharge: HOME | End: 2022-10-07
Attending: FAMILY MEDICINE
Payer: COMMERCIAL

## 2022-10-07 DIAGNOSIS — E04.1 THYROID NODULE: ICD-10-CM

## 2022-10-07 PROCEDURE — 76536 US EXAM OF HEAD AND NECK: CPT | Performed by: FAMILY MEDICINE

## 2022-10-13 ENCOUNTER — PATIENT MESSAGE (OUTPATIENT)
Dept: INTERNAL MEDICINE CLINIC | Facility: CLINIC | Age: 52
End: 2022-10-13

## 2022-10-13 LAB
ABSOLUTE BASOPHILS: 17 CELLS/UL (ref 0–200)
ABSOLUTE EOSINOPHILS: 191 CELLS/UL (ref 15–500)
ABSOLUTE LYMPHOCYTES: 1491 CELLS/UL (ref 850–3900)
ABSOLUTE MONOCYTES: 447 CELLS/UL (ref 200–950)
ABSOLUTE NEUTROPHILS: 3654 CELLS/UL (ref 1500–7800)
ALBUMIN/GLOBULIN RATIO: 1.5 (CALC) (ref 1–2.5)
ALBUMIN: 4.2 G/DL (ref 3.6–5.1)
ALKALINE PHOSPHATASE: 66 U/L (ref 37–153)
ALT: 15 U/L (ref 6–29)
AST: 15 U/L (ref 10–35)
BASOPHILS: 0.3 %
BILIRUBIN, TOTAL: 0.4 MG/DL (ref 0.2–1.2)
BUN: 13 MG/DL (ref 7–25)
CALCIUM: 9.4 MG/DL (ref 8.6–10.4)
CARBON DIOXIDE: 27 MMOL/L (ref 20–32)
CHLORIDE: 102 MMOL/L (ref 98–110)
CHOL/HDLC RATIO: 2.5 (CALC)
CHOLESTEROL, TOTAL: 205 MG/DL
CREATININE: 0.77 MG/DL (ref 0.5–1.03)
EGFR: 94 ML/MIN/1.73M2
EOSINOPHILS: 3.3 %
GLOBULIN: 2.8 G/DL (CALC) (ref 1.9–3.7)
GLUCOSE: 82 MG/DL (ref 65–99)
HDL CHOLESTEROL: 82 MG/DL
HEMATOCRIT: 38.6 % (ref 35–45)
HEMOGLOBIN: 12.7 G/DL (ref 11.7–15.5)
LDL-CHOLESTEROL: 102 MG/DL (CALC)
LYMPHOCYTES: 25.7 %
MCH: 27.9 PG (ref 27–33)
MCHC: 32.9 G/DL (ref 32–36)
MCV: 84.6 FL (ref 80–100)
MONOCYTES: 7.7 %
MPV: 9.3 FL (ref 7.5–12.5)
NEUTROPHILS: 63 %
NON-HDL CHOLESTEROL: 123 MG/DL (CALC)
PLATELET COUNT: 314 THOUSAND/UL (ref 140–400)
POTASSIUM: 4.2 MMOL/L (ref 3.5–5.3)
PROTEIN, TOTAL: 7 G/DL (ref 6.1–8.1)
RDW: 13.3 % (ref 11–15)
RED BLOOD CELL COUNT: 4.56 MILLION/UL (ref 3.8–5.1)
SODIUM: 138 MMOL/L (ref 135–146)
TRIGLYCERIDES: 109 MG/DL
TSH: 1.48 MIU/L
WHITE BLOOD CELL COUNT: 5.8 THOUSAND/UL (ref 3.8–10.8)

## 2022-10-13 NOTE — TELEPHONE ENCOUNTER
From: Cristy Kim  To: Elvan Cowden, PA-C  Sent: 10/13/2022 10:53 AM CDT  Subject: Mounjaro vs. Wegovy    Hi. I have been on Mounjaro 2.5 for almost 2 weeks now with no weight loss and very little appetite suppression. Is there anyway I can get the next level (5.0) of Mounjaro? OR. Emanuel Lipoma Emanuel Lipoma I would really love to get back on Wegovy 1.7 because that dose worked the best for me! I just entered the insurance info and your name on the [address] [place] [address] site and it says my insurance covers it? Maybe something changed? Can I get Wegovy 1.7 again? Thanks for any feedback. This 2.5 Kerry Waller is doing nothing.

## 2022-10-17 RX ORDER — TIRZEPATIDE 5 MG/.5ML
5 INJECTION, SOLUTION SUBCUTANEOUS WEEKLY
Qty: 2 ML | Refills: 0 | Status: SHIPPED | OUTPATIENT
Start: 2022-10-17

## 2022-10-17 NOTE — TELEPHONE ENCOUNTER
We can do either, the only concern would be GI side effects with going right back to the 1.7 of Wegovy  If she would like to switch back to [facility]DYLLAN MCMAHAN we can, especially if she feels like the Sheng Maria is not doing anything

## 2022-10-19 DIAGNOSIS — E78.2 MIXED DYSLIPIDEMIA: ICD-10-CM

## 2022-10-19 RX ORDER — ATORVASTATIN CALCIUM 10 MG/1
10 TABLET, FILM COATED ORAL DAILY
Qty: 90 TABLET | Refills: 3 | Status: SHIPPED | OUTPATIENT
Start: 2022-10-19

## 2022-10-28 DIAGNOSIS — F32.81 PMDD (PREMENSTRUAL DYSPHORIC DISORDER): ICD-10-CM

## 2022-10-28 DIAGNOSIS — F41.9 ANXIETY: ICD-10-CM

## 2022-10-28 DIAGNOSIS — Z51.81 ENCOUNTER FOR THERAPEUTIC DRUG MONITORING: ICD-10-CM

## 2022-10-28 RX ORDER — FLUOXETINE HYDROCHLORIDE 40 MG/1
40 CAPSULE ORAL DAILY
Qty: 90 CAPSULE | Refills: 0 | Status: CANCELLED | OUTPATIENT
Start: 2022-10-28

## 2022-11-10 NOTE — TELEPHONE ENCOUNTER
Requesting Mounjaro increase  LOV: 8/31/22  RTC: not noted  Last Relevant Labs: 12/28/19  Filled: 10/17/22 #2ml with 0 refills  Mounjaro 5 mg    No future appointments.

## 2022-11-11 RX ORDER — TIRZEPATIDE 7.5 MG/.5ML
7.5 INJECTION, SOLUTION SUBCUTANEOUS WEEKLY
Qty: 2 ML | Refills: 0 | Status: SHIPPED | OUTPATIENT
Start: 2022-11-11

## 2022-11-13 RX ORDER — TIRZEPATIDE 5 MG/.5ML
5 INJECTION, SOLUTION SUBCUTANEOUS WEEKLY
Qty: 2 ML | Refills: 0 | OUTPATIENT
Start: 2022-11-13

## 2022-12-21 RX ORDER — TIRZEPATIDE 7.5 MG/.5ML
INJECTION, SOLUTION SUBCUTANEOUS
Qty: 2 ML | Refills: 0 | OUTPATIENT
Start: 2022-12-21

## 2022-12-21 NOTE — TELEPHONE ENCOUNTER
Requesting Mounjaro  LOV: 8/31/22  RTC: not noted  Last Relevant Labs: 12/28/19  Filled: 11/11/22 #2ml with 0 refills  Mounjaro 7.5 mg    Future Appointments   Date Time Provider Jamey Wagoner   1/5/2023 10:40 AM Claudell Sill, PA-C [address] [facility] [address]

## 2022-12-22 RX ORDER — TIRZEPATIDE 5 MG/.5ML
5 INJECTION, SOLUTION SUBCUTANEOUS WEEKLY
Qty: 2 ML | Refills: 0 | Status: SHIPPED | OUTPATIENT
Start: 2022-12-22

## 2022-12-28 ENCOUNTER — TELEPHONE (OUTPATIENT)
Dept: INTERNAL MEDICINE CLINIC | Facility: CLINIC | Age: 52
End: 2022-12-28

## 2023-01-05 ENCOUNTER — OFFICE VISIT (OUTPATIENT)
Dept: INTERNAL MEDICINE CLINIC | Facility: CLINIC | Age: 53
End: 2023-01-05
Payer: COMMERCIAL

## 2023-01-05 VITALS
WEIGHT: 183 LBS | HEIGHT: 64 IN | BODY MASS INDEX: 31.24 KG/M2 | HEART RATE: 78 BPM | RESPIRATION RATE: 16 BRPM | SYSTOLIC BLOOD PRESSURE: 120 MMHG | DIASTOLIC BLOOD PRESSURE: 80 MMHG

## 2023-01-05 DIAGNOSIS — Z51.81 ENCOUNTER FOR THERAPEUTIC DRUG MONITORING: Primary | ICD-10-CM

## 2023-01-05 DIAGNOSIS — F41.9 ANXIETY: ICD-10-CM

## 2023-01-05 DIAGNOSIS — E78.2 MIXED HYPERLIPIDEMIA: ICD-10-CM

## 2023-01-05 DIAGNOSIS — R73.01 IFG (IMPAIRED FASTING GLUCOSE): ICD-10-CM

## 2023-01-05 DIAGNOSIS — E66.9 CLASS 1 OBESITY WITH SERIOUS COMORBIDITY AND BODY MASS INDEX (BMI) OF 31.0 TO 31.9 IN ADULT, UNSPECIFIED OBESITY TYPE: ICD-10-CM

## 2023-01-05 PROCEDURE — 99214 OFFICE O/P EST MOD 30 MIN: CPT | Performed by: PHYSICIAN ASSISTANT

## 2023-01-05 PROCEDURE — 3079F DIAST BP 80-89 MM HG: CPT | Performed by: PHYSICIAN ASSISTANT

## 2023-01-05 PROCEDURE — 3074F SYST BP LT 130 MM HG: CPT | Performed by: PHYSICIAN ASSISTANT

## 2023-01-05 PROCEDURE — 3008F BODY MASS INDEX DOCD: CPT | Performed by: PHYSICIAN ASSISTANT

## 2023-01-05 RX ORDER — TIRZEPATIDE 10 MG/.5ML
10 INJECTION, SOLUTION SUBCUTANEOUS WEEKLY
Qty: 2 ML | Refills: 0 | Status: SHIPPED | OUTPATIENT
Start: 2023-01-05

## 2023-02-06 NOTE — PROGRESS NOTES
Received via fax copies of the Bilateral Digital Mammogram and the Thyroid Ultrasound reports from [facility] [facility]) located at [address] [place] [address] in [facility]. Phone # [phone], Fax # [phone].    Date of exams were 10/25/
Yes
Current every day smoker

## 2023-02-07 RX ORDER — SEMAGLUTIDE 1.7 MG/.75ML
INJECTION, SOLUTION SUBCUTANEOUS
Qty: 3 ML | Refills: 0 | OUTPATIENT
Start: 2023-02-07

## 2023-03-27 RX ORDER — TIRZEPATIDE 10 MG/.5ML
INJECTION, SOLUTION SUBCUTANEOUS
Refills: 0 | OUTPATIENT
Start: 2023-03-27

## 2023-03-31 ENCOUNTER — OFFICE VISIT (OUTPATIENT)
Dept: INTERNAL MEDICINE CLINIC | Facility: CLINIC | Age: 53
End: 2023-03-31
Payer: COMMERCIAL

## 2023-03-31 VITALS
BODY MASS INDEX: 30.73 KG/M2 | HEIGHT: 64 IN | HEART RATE: 77 BPM | OXYGEN SATURATION: 98 % | WEIGHT: 180 LBS | RESPIRATION RATE: 16 BRPM | SYSTOLIC BLOOD PRESSURE: 122 MMHG | DIASTOLIC BLOOD PRESSURE: 78 MMHG

## 2023-03-31 DIAGNOSIS — R73.01 IFG (IMPAIRED FASTING GLUCOSE): ICD-10-CM

## 2023-03-31 DIAGNOSIS — F50.81 BINGE EATING DISORDER: ICD-10-CM

## 2023-03-31 DIAGNOSIS — Z51.81 ENCOUNTER FOR THERAPEUTIC DRUG MONITORING: ICD-10-CM

## 2023-03-31 DIAGNOSIS — F41.9 ANXIETY: ICD-10-CM

## 2023-03-31 DIAGNOSIS — E66.9 CLASS 1 OBESITY WITH SERIOUS COMORBIDITY AND BODY MASS INDEX (BMI) OF 31.0 TO 31.9 IN ADULT, UNSPECIFIED OBESITY TYPE: ICD-10-CM

## 2023-03-31 DIAGNOSIS — Z51.81 ENCOUNTER FOR THERAPEUTIC DRUG MONITORING: Primary | ICD-10-CM

## 2023-03-31 DIAGNOSIS — F32.81 PMDD (PREMENSTRUAL DYSPHORIC DISORDER): ICD-10-CM

## 2023-03-31 DIAGNOSIS — E78.2 MIXED HYPERLIPIDEMIA: ICD-10-CM

## 2023-03-31 PROCEDURE — 3008F BODY MASS INDEX DOCD: CPT | Performed by: PHYSICIAN ASSISTANT

## 2023-03-31 PROCEDURE — 99214 OFFICE O/P EST MOD 30 MIN: CPT | Performed by: PHYSICIAN ASSISTANT

## 2023-03-31 PROCEDURE — 3074F SYST BP LT 130 MM HG: CPT | Performed by: PHYSICIAN ASSISTANT

## 2023-03-31 PROCEDURE — 3078F DIAST BP <80 MM HG: CPT | Performed by: PHYSICIAN ASSISTANT

## 2023-03-31 RX ORDER — SEMAGLUTIDE 1.7 MG/.75ML
INJECTION, SOLUTION SUBCUTANEOUS
COMMUNITY
Start: 2022-12-22

## 2023-04-03 RX ORDER — FLUOXETINE HYDROCHLORIDE 40 MG/1
CAPSULE ORAL
Qty: 90 CAPSULE | Refills: 1 | Status: SHIPPED | OUTPATIENT
Start: 2023-04-03

## 2023-04-18 RX ORDER — TIRZEPATIDE 10 MG/.5ML
INJECTION, SOLUTION SUBCUTANEOUS
Refills: 0 | OUTPATIENT
Start: 2023-04-18

## 2023-05-01 RX ORDER — TIRZEPATIDE 12.5 MG/.5ML
12.5 INJECTION, SOLUTION SUBCUTANEOUS WEEKLY
Qty: 2 ML | Refills: 0 | Status: SHIPPED | OUTPATIENT
Start: 2023-05-01

## 2023-05-01 RX ORDER — TIRZEPATIDE 10 MG/.5ML
INJECTION, SOLUTION SUBCUTANEOUS
Refills: 0 | OUTPATIENT
Start: 2023-05-01

## 2023-05-01 NOTE — TELEPHONE ENCOUNTER
Requesting Mounjaro  LOV: 3/31/23  RTC: not noted  Last Relevant Labs: 12/28/19  Filled: 4/11/23 #2ml with 0 refills  mounjaro 10 mg     No future appointments.

## 2023-06-06 ENCOUNTER — OFFICE VISIT (OUTPATIENT)
Dept: FAMILY MEDICINE CLINIC | Facility: CLINIC | Age: 53
End: 2023-06-06
Payer: COMMERCIAL

## 2023-06-06 VITALS
WEIGHT: 178 LBS | TEMPERATURE: 97 F | RESPIRATION RATE: 16 BRPM | HEIGHT: 64 IN | BODY MASS INDEX: 30.39 KG/M2 | SYSTOLIC BLOOD PRESSURE: 110 MMHG | OXYGEN SATURATION: 99 % | HEART RATE: 97 BPM | DIASTOLIC BLOOD PRESSURE: 80 MMHG

## 2023-06-06 DIAGNOSIS — N30.01 ACUTE CYSTITIS WITH HEMATURIA: Primary | ICD-10-CM

## 2023-06-06 LAB
APPEARANCE: CLEAR
BILIRUBIN: NEGATIVE
GLUCOSE (URINE DIPSTICK): 100 MG/DL
KETONES (URINE DIPSTICK): NEGATIVE MG/DL
NITRITE, URINE: NEGATIVE
PH, URINE: 6 (ref 4.5–8)
PROTEIN (URINE DIPSTICK): 100 MG/DL
SPECIFIC GRAVITY: 1.02 (ref 1–1.03)
URINE-COLOR: YELLOW
UROBILINOGEN,SEMI-QN: 0.2 MG/DL (ref 0–1.9)

## 2023-06-06 PROCEDURE — 3079F DIAST BP 80-89 MM HG: CPT | Performed by: FAMILY MEDICINE

## 2023-06-06 PROCEDURE — 3074F SYST BP LT 130 MM HG: CPT | Performed by: FAMILY MEDICINE

## 2023-06-06 PROCEDURE — 87086 URINE CULTURE/COLONY COUNT: CPT | Performed by: FAMILY MEDICINE

## 2023-06-06 PROCEDURE — 99213 OFFICE O/P EST LOW 20 MIN: CPT | Performed by: FAMILY MEDICINE

## 2023-06-06 PROCEDURE — 3008F BODY MASS INDEX DOCD: CPT | Performed by: FAMILY MEDICINE

## 2023-06-06 PROCEDURE — 81003 URINALYSIS AUTO W/O SCOPE: CPT | Performed by: FAMILY MEDICINE

## 2023-06-06 RX ORDER — PHENAZOPYRIDINE HYDROCHLORIDE 200 MG/1
200 TABLET, FILM COATED ORAL 3 TIMES DAILY PRN
Qty: 15 TABLET | Refills: 0 | Status: SHIPPED | OUTPATIENT
Start: 2023-06-06 | End: 2023-06-11

## 2023-06-06 RX ORDER — TIRZEPATIDE 12.5 MG/.5ML
12.5 INJECTION, SOLUTION SUBCUTANEOUS WEEKLY
Qty: 2 ML | Refills: 0 | Status: SHIPPED | OUTPATIENT
Start: 2023-06-06

## 2023-06-06 RX ORDER — NITROFURANTOIN 25; 75 MG/1; MG/1
100 CAPSULE ORAL 2 TIMES DAILY
Qty: 14 CAPSULE | Refills: 0 | Status: SHIPPED | OUTPATIENT
Start: 2023-06-06 | End: 2023-06-13

## 2023-06-13 ENCOUNTER — LAB ENCOUNTER (OUTPATIENT)
Dept: LAB | Age: 53
End: 2023-06-13
Attending: INTERNAL MEDICINE
Payer: COMMERCIAL

## 2023-06-13 DIAGNOSIS — E78.5 HYPERLIPEMIA: Primary | ICD-10-CM

## 2023-06-13 DIAGNOSIS — E78.2 MIXED HYPERLIPIDEMIA: ICD-10-CM

## 2023-06-13 LAB
CHOLEST SERPL-MCNC: 181 MG/DL (ref ?–200)
FASTING PATIENT LIPID ANSWER: YES
HDLC SERPL-MCNC: 69 MG/DL (ref 40–59)
LDLC SERPL CALC-MCNC: 76 MG/DL (ref ?–100)
NONHDLC SERPL-MCNC: 112 MG/DL (ref ?–130)
TRIGL SERPL-MCNC: 224 MG/DL (ref 30–149)
VLDLC SERPL CALC-MCNC: 35 MG/DL (ref 0–30)

## 2023-06-13 PROCEDURE — 36415 COLL VENOUS BLD VENIPUNCTURE: CPT

## 2023-06-13 PROCEDURE — 80061 LIPID PANEL: CPT

## 2023-06-15 ENCOUNTER — HOSPITAL ENCOUNTER (OUTPATIENT)
Dept: CT IMAGING | Age: 53
Discharge: HOME | End: 2023-06-15
Attending: INTERNAL MEDICINE

## 2023-06-15 VITALS
DIASTOLIC BLOOD PRESSURE: 60 MMHG | BODY MASS INDEX: 30.39 KG/M2 | SYSTOLIC BLOOD PRESSURE: 102 MMHG | WEIGHT: 178 LBS | HEIGHT: 64 IN

## 2023-06-15 DIAGNOSIS — Z13.9 SCREENING DUE: ICD-10-CM

## 2023-07-10 ENCOUNTER — TELEPHONE (OUTPATIENT)
Dept: INTERNAL MEDICINE CLINIC | Facility: CLINIC | Age: 53
End: 2023-07-10

## 2023-07-27 NOTE — TELEPHONE ENCOUNTER
Approval from [facility] for Wegovy 2.4 mg  7/10/23 to 2/10/24  Message from plan: Your PA request has been approved. Additional information will be provided in the approval communication.  (Message [id number])

## 2023-08-14 ENCOUNTER — OFFICE VISIT (OUTPATIENT)
Dept: INTERNAL MEDICINE CLINIC | Facility: CLINIC | Age: 53
End: 2023-08-14
Payer: COMMERCIAL

## 2023-08-14 VITALS
HEART RATE: 80 BPM | DIASTOLIC BLOOD PRESSURE: 74 MMHG | WEIGHT: 171 LBS | RESPIRATION RATE: 16 BRPM | BODY MASS INDEX: 29.19 KG/M2 | SYSTOLIC BLOOD PRESSURE: 118 MMHG | HEIGHT: 64 IN

## 2023-08-14 DIAGNOSIS — R73.01 IFG (IMPAIRED FASTING GLUCOSE): ICD-10-CM

## 2023-08-14 DIAGNOSIS — Z51.81 ENCOUNTER FOR THERAPEUTIC DRUG MONITORING: Primary | ICD-10-CM

## 2023-08-14 DIAGNOSIS — E88.81 INSULIN RESISTANCE: ICD-10-CM

## 2023-08-14 DIAGNOSIS — E78.2 MIXED HYPERLIPIDEMIA: ICD-10-CM

## 2023-08-14 DIAGNOSIS — E66.3 OVERWEIGHT (BMI 25.0-29.9): ICD-10-CM

## 2023-08-14 PROCEDURE — 3008F BODY MASS INDEX DOCD: CPT | Performed by: PHYSICIAN ASSISTANT

## 2023-08-14 PROCEDURE — 99214 OFFICE O/P EST MOD 30 MIN: CPT | Performed by: PHYSICIAN ASSISTANT

## 2023-08-14 PROCEDURE — 3074F SYST BP LT 130 MM HG: CPT | Performed by: PHYSICIAN ASSISTANT

## 2023-08-14 PROCEDURE — 3078F DIAST BP <80 MM HG: CPT | Performed by: PHYSICIAN ASSISTANT

## 2023-08-16 ENCOUNTER — PATIENT MESSAGE (OUTPATIENT)
Dept: FAMILY MEDICINE CLINIC | Facility: CLINIC | Age: 53
End: 2023-08-16

## 2023-08-16 NOTE — TELEPHONE ENCOUNTER
From: Sharmaine Steiner  To: Mitul An MD  Sent: 8/16/2023 10:25 AM CDT  Subject: Thyroid Scan    Hi. Can you please have Dr. Chantel Yin submit an order for my annual thyroid scan at the [facility] on [address]  ? I called them to make an appt. but they don't have the order.    [address] [place] [address]

## 2023-08-29 ENCOUNTER — OFFICE VISIT (OUTPATIENT)
Dept: FAMILY MEDICINE CLINIC | Facility: CLINIC | Age: 53
End: 2023-08-29
Payer: COMMERCIAL

## 2023-08-29 VITALS
HEIGHT: 64 IN | TEMPERATURE: 98 F | WEIGHT: 172 LBS | SYSTOLIC BLOOD PRESSURE: 112 MMHG | RESPIRATION RATE: 16 BRPM | HEART RATE: 85 BPM | BODY MASS INDEX: 29.37 KG/M2 | OXYGEN SATURATION: 99 % | DIASTOLIC BLOOD PRESSURE: 70 MMHG

## 2023-08-29 DIAGNOSIS — Z00.00 LABORATORY EXAM ORDERED AS PART OF ROUTINE GENERAL MEDICAL EXAMINATION: ICD-10-CM

## 2023-08-29 DIAGNOSIS — E04.1 THYROID NODULE: ICD-10-CM

## 2023-08-29 DIAGNOSIS — Z00.00 ROUTINE MEDICAL EXAM: Primary | ICD-10-CM

## 2023-08-29 DIAGNOSIS — F32.81 PMDD (PREMENSTRUAL DYSPHORIC DISORDER): ICD-10-CM

## 2023-08-29 DIAGNOSIS — E78.2 MIXED DYSLIPIDEMIA: ICD-10-CM

## 2023-08-29 DIAGNOSIS — F41.9 ANXIETY: ICD-10-CM

## 2023-08-29 PROCEDURE — 3074F SYST BP LT 130 MM HG: CPT | Performed by: FAMILY MEDICINE

## 2023-08-29 PROCEDURE — 99396 PREV VISIT EST AGE 40-64: CPT | Performed by: FAMILY MEDICINE

## 2023-08-29 PROCEDURE — 3078F DIAST BP <80 MM HG: CPT | Performed by: FAMILY MEDICINE

## 2023-08-29 PROCEDURE — 3008F BODY MASS INDEX DOCD: CPT | Performed by: FAMILY MEDICINE

## 2023-08-29 RX ORDER — ATORVASTATIN CALCIUM 10 MG/1
10 TABLET, FILM COATED ORAL DAILY
Qty: 90 TABLET | Refills: 3 | Status: SHIPPED | OUTPATIENT
Start: 2023-08-29

## 2023-08-29 RX ORDER — ASCORBIC ACID 500 MG
500 TABLET ORAL DAILY
COMMUNITY
Start: 2023-06-08

## 2023-08-29 RX ORDER — SEMAGLUTIDE 2.4 MG/.75ML
2.4 INJECTION, SOLUTION SUBCUTANEOUS WEEKLY
COMMUNITY
Start: 2023-08-20

## 2023-08-29 RX ORDER — FLUOXETINE HYDROCHLORIDE 40 MG/1
40 CAPSULE ORAL EVERY MORNING
Qty: 90 CAPSULE | Refills: 3 | Status: SHIPPED | OUTPATIENT
Start: 2023-08-29

## 2023-09-26 RX ORDER — SEMAGLUTIDE 2.4 MG/.75ML
2.4 INJECTION, SOLUTION SUBCUTANEOUS WEEKLY
Qty: 9 ML | Refills: 1 | Status: SHIPPED | OUTPATIENT
Start: 2023-09-26

## 2023-09-26 NOTE — TELEPHONE ENCOUNTER
Requesting   Requested Prescriptions     Pending Prescriptions Disp Refills    WEGOVY 2.4 MG/0.75ML Subcutaneous Solution Auto-injector [Pharmacy Med Name: Onnie Lime 2.4 MG/0.75 ML PEN]  0     Sig: INJECT 0.75 ML (2.4 MG TOTAL) INTO THE SKIN ONCE A WEEK FOR 4 DOSES.     LOV: 8/14/23  RTC: not noted  Filled: 3/31/23 #3 with 0 refills    No future appointments.

## 2023-11-03 ENCOUNTER — LAB ENCOUNTER (OUTPATIENT)
Dept: LAB | Age: 53
End: 2023-11-03
Attending: FAMILY MEDICINE
Payer: COMMERCIAL

## 2023-11-03 DIAGNOSIS — Z00.00 LABORATORY EXAM ORDERED AS PART OF ROUTINE GENERAL MEDICAL EXAMINATION: ICD-10-CM

## 2023-11-03 DIAGNOSIS — E04.1 THYROID NODULE: ICD-10-CM

## 2023-11-03 LAB
ALBUMIN SERPL-MCNC: 3.6 G/DL (ref 3.4–5)
ALBUMIN/GLOB SERPL: 0.9 {RATIO} (ref 1–2)
ALP LIVER SERPL-CCNC: 63 U/L
ALT SERPL-CCNC: 25 U/L
ANION GAP SERPL CALC-SCNC: 4 MMOL/L (ref 0–18)
AST SERPL-CCNC: 17 U/L (ref 15–37)
BASOPHILS # BLD AUTO: 0.01 X10(3) UL (ref 0–0.2)
BASOPHILS NFR BLD AUTO: 0.2 %
BILIRUB SERPL-MCNC: 0.4 MG/DL (ref 0.1–2)
BUN BLD-MCNC: 14 MG/DL (ref 9–23)
CALCIUM BLD-MCNC: 9 MG/DL (ref 8.5–10.1)
CHLORIDE SERPL-SCNC: 108 MMOL/L (ref 98–112)
CO2 SERPL-SCNC: 26 MMOL/L (ref 21–32)
CREAT BLD-MCNC: 1.02 MG/DL
EGFRCR SERPLBLD CKD-EPI 2021: 67 ML/MIN/1.73M2 (ref 60–?)
EOSINOPHIL # BLD AUTO: 0.18 X10(3) UL (ref 0–0.7)
EOSINOPHIL NFR BLD AUTO: 2.9 %
ERYTHROCYTE [DISTWIDTH] IN BLOOD BY AUTOMATED COUNT: 12 %
FASTING STATUS PATIENT QL REPORTED: YES
GLOBULIN PLAS-MCNC: 3.8 G/DL (ref 2.8–4.4)
GLUCOSE BLD-MCNC: 85 MG/DL (ref 70–99)
HCT VFR BLD AUTO: 42.1 %
HGB BLD-MCNC: 13.9 G/DL
IMM GRANULOCYTES # BLD AUTO: 0.01 X10(3) UL (ref 0–1)
IMM GRANULOCYTES NFR BLD: 0.2 %
LYMPHOCYTES # BLD AUTO: 1.53 X10(3) UL (ref 1–4)
LYMPHOCYTES NFR BLD AUTO: 24.6 %
MCH RBC QN AUTO: 28.8 PG (ref 26–34)
MCHC RBC AUTO-ENTMCNC: 33 G/DL (ref 31–37)
MCV RBC AUTO: 87.2 FL
MONOCYTES # BLD AUTO: 0.5 X10(3) UL (ref 0.1–1)
MONOCYTES NFR BLD AUTO: 8.1 %
NEUTROPHILS # BLD AUTO: 3.98 X10 (3) UL (ref 1.5–7.7)
NEUTROPHILS # BLD AUTO: 3.98 X10(3) UL (ref 1.5–7.7)
NEUTROPHILS NFR BLD AUTO: 64 %
OSMOLALITY SERPL CALC.SUM OF ELEC: 286 MOSM/KG (ref 275–295)
PLATELET # BLD AUTO: 308 10(3)UL (ref 150–450)
POTASSIUM SERPL-SCNC: 4.2 MMOL/L (ref 3.5–5.1)
PROT SERPL-MCNC: 7.4 G/DL (ref 6.4–8.2)
RBC # BLD AUTO: 4.83 X10(6)UL
SODIUM SERPL-SCNC: 138 MMOL/L (ref 136–145)
TSI SER-ACNC: 1.28 MIU/ML (ref 0.36–3.74)
WBC # BLD AUTO: 6.2 X10(3) UL (ref 4–11)

## 2023-11-03 PROCEDURE — 80050 GENERAL HEALTH PANEL: CPT | Performed by: FAMILY MEDICINE

## 2024-02-10 RX ORDER — SEMAGLUTIDE 2.4 MG/.75ML
2.4 INJECTION, SOLUTION SUBCUTANEOUS WEEKLY
Refills: 1 | OUTPATIENT
Start: 2024-02-10

## 2024-02-10 NOTE — TELEPHONE ENCOUNTER
Requesting   Requested Prescriptions     Pending Prescriptions Disp Refills    WEGOVY 2.4 MG/0.75ML Subcutaneous Solution Auto-injector [Pharmacy Med Name: WEGOVY 2.4 MG/0.75 ML PEN]  1     Sig: INJECT 0.75 ML (2.4 MG TOTAL) INTO THE SKIN ONCE A WEEK.     LOV: 8/14/23  RTC: not noted  Filled: 9/26/23 #9 with 1 refills    Future Appointments   Date Time Provider Department Center   5/16/2024  3:40 PM Trinidad Kennedy PA-C [facility] [address]     Refill too soon

## 2024-03-19 RX ORDER — SEMAGLUTIDE 2.4 MG/.75ML
2.4 INJECTION, SOLUTION SUBCUTANEOUS WEEKLY
Qty: 9 ML | Refills: 1 | OUTPATIENT
Start: 2024-03-19

## 2024-03-21 ENCOUNTER — OFFICE VISIT (OUTPATIENT)
Dept: INTERNAL MEDICINE CLINIC | Facility: CLINIC | Age: 54
End: 2024-03-21
Payer: COMMERCIAL

## 2024-03-21 VITALS
WEIGHT: 175 LBS | HEART RATE: 86 BPM | DIASTOLIC BLOOD PRESSURE: 68 MMHG | OXYGEN SATURATION: 99 % | RESPIRATION RATE: 18 BRPM | SYSTOLIC BLOOD PRESSURE: 102 MMHG | HEIGHT: 64 IN | BODY MASS INDEX: 29.88 KG/M2

## 2024-03-21 DIAGNOSIS — E66.9 CLASS 1 OBESITY WITH SERIOUS COMORBIDITY AND BODY MASS INDEX (BMI) OF 30.0 TO 30.9 IN ADULT, UNSPECIFIED OBESITY TYPE: ICD-10-CM

## 2024-03-21 DIAGNOSIS — F41.9 ANXIETY: ICD-10-CM

## 2024-03-21 DIAGNOSIS — R73.01 IFG (IMPAIRED FASTING GLUCOSE): ICD-10-CM

## 2024-03-21 DIAGNOSIS — E78.2 MIXED HYPERLIPIDEMIA: ICD-10-CM

## 2024-03-21 DIAGNOSIS — Z51.81 ENCOUNTER FOR THERAPEUTIC DRUG MONITORING: Primary | ICD-10-CM

## 2024-03-21 DIAGNOSIS — E88.819 INSULIN RESISTANCE: ICD-10-CM

## 2024-03-21 PROCEDURE — 3074F SYST BP LT 130 MM HG: CPT | Performed by: PHYSICIAN ASSISTANT

## 2024-03-21 PROCEDURE — 3008F BODY MASS INDEX DOCD: CPT | Performed by: PHYSICIAN ASSISTANT

## 2024-03-21 PROCEDURE — 3078F DIAST BP <80 MM HG: CPT | Performed by: PHYSICIAN ASSISTANT

## 2024-03-21 PROCEDURE — 99214 OFFICE O/P EST MOD 30 MIN: CPT | Performed by: PHYSICIAN ASSISTANT

## 2024-03-21 RX ORDER — TIRZEPATIDE 7.5 MG/.5ML
7.5 INJECTION, SOLUTION SUBCUTANEOUS WEEKLY
Qty: 6 ML | Refills: 1 | Status: SHIPPED | OUTPATIENT
Start: 2024-03-21

## 2024-03-21 NOTE — PROGRESS NOTES
HISTORY OF PRESENT ILLNESS  Chief Complaint   Patient presents with    Weight Check     Up 4lbs       Cody Slade is a 53 year old female here for follow up in medical weight loss program.   +4lbs  Compliant on wegovy  Denies chest pain, shortness of breath, dizziness, blurred vision, headache, paresthesia, nausea/vomiting.   Moving out of state in June, [place]  Doesn't feel like wegovy is working as well  Still mindful about what she is eating  Increased cravings for sweets    Exercise/Activity: knows she needs to increase exercise, has been trying to be more active  Nutrition: 24 hour food log reviewed, eating regular meals, +protein  Stress: 8/10  Sleep: 7 hours/night       Wt Readings from Last 6 Encounters:   03/21/24 175 lb (79.4 kg)   08/29/23 172 lb (78 kg)   08/14/23 171 lb (77.6 kg)   06/15/23 178 lb (80.7 kg)   06/06/23 178 lb (80.7 kg)   03/31/23 180 lb (81.6 kg)            Breakfast Lunch Dinner Snacks Fluids   Reviewed           REVIEW OF SYSTEMS  GENERAL HEALTH: feels well otherwise, denied any fevers chills or night sweats   RESPIRATORY: denies shortness of breath   CARDIOVASCULAR: denies chest pain  GI: denies abdominal pain    EXAM  /68   Pulse 86   Resp 18   Ht 5' 4\" (1.626 m)   Wt 175 lb (79.4 kg)   SpO2 99%   BMI 30.04 kg/m²   GENERAL: well developed, well nourished,in no apparent distress, A/O x3  SKIN: no rashes,no suspicious lesions  HEENT: atraumatic, normocephalic, OP-clear, PERRL  NECK: supple,no adenopathy  LUNGS: clear to auscultation bilaterally   CARDIO: RRR without murmur  GI: good BS's,NT/ND, no masses or HSM  EXTREMITIES: no cyanosis, no clubbing, no edema    Lab Results   Component Value Date    WBC 6.2 11/03/2023    RBC 4.83 11/03/2023    HGB 13.9 11/03/2023    HCT 42.1 11/03/2023    MCV 87.2 11/03/2023    MCH 28.8 11/03/2023    MCHC 33.0 11/03/2023    RDW 12.0 11/03/2023    .0 11/03/2023     Lab Results   Component Value Date    GLU 85 11/03/2023    BUN 14  11/03/2023    BUNCREA NOT APPLICABLE 10/12/2022    CREATSERUM 1.02 11/03/2023    ANIONGAP 4 11/03/2023    GFR >59 02/28/2009    GFRNAA 75 11/08/2021    GFRAA 87 11/08/2021    CA 9.0 11/03/2023    OSMOCALC 286 11/03/2023    ALKPHO 63 11/03/2023    AST 17 11/03/2023    ALT 25 11/03/2023    BILT 0.4 11/03/2023    TP 7.4 11/03/2023    ALB 3.6 11/03/2023    GLOBULIN 3.8 11/03/2023    AGRATIO 1.5 10/12/2022     11/03/2023    K 4.2 11/03/2023     11/03/2023    CO2 26.0 11/03/2023     Lab Results   Component Value Date    A1C 5.4 12/28/2019     Lab Results   Component Value Date    CHOLEST 181 06/13/2023    TRIG 224 (H) 06/13/2023    HDL 69 (H) 06/13/2023    LDL 76 06/13/2023    VLDL 35 (H) 06/13/2023    TCHDLRATIO 2.5 10/12/2022    NONHDLC 112 06/13/2023     Lab Results   Component Value Date    T4F 0.9 07/06/2019    TSH 1.280 11/03/2023    TSHT4 1.90 07/13/2017     Lab Results   Component Value Date    VITB12 591 12/28/2019     Lab Results   Component Value Date    VITD 31 12/28/2019       Current Outpatient Medications on File Prior to Visit   Medication Sig Dispense Refill    semaglutide-weight management (WEGOVY) 2.4 MG/0.75ML Subcutaneous Solution Auto-injector Inject 0.75 mL (2.4 mg total) into the skin once a week. 9 mL 1    Vitamin C 500 MG Oral Tab Take 1 tablet (500 mg total) by mouth daily.      atorvastatin 10 MG Oral Tab Take 1 tablet (10 mg total) by mouth daily. 90 tablet 3    FLUoxetine HCl 40 MG Oral Cap Take 1 capsule (40 mg total) by mouth every morning. 90 capsule 3    B Complex Vitamins (VITAMIN-B COMPLEX OR) Take  by mouth.      Calcium Carbonate-Vitamin D (CALCIUM 500 + D OR) Take  by mouth.      Betamethasone Dipropionate Aug 0.05 % External Cream Apply 1 Units topically 2 (two) times daily as needed (dry hands). 50 g 2    DAILY MULTIVITAMIN OR 1 tablet daily       No current facility-administered medications on file prior to visit.       ASSESSMENT  Analyzed weight data:       Diagnoses  and all orders for this visit:    Encounter for therapeutic drug monitoring  -     Tirzepatide-Weight Management (ZEPBOUND) 7.5 MG/0.5ML Subcutaneous Solution Auto-injector; Inject 7.5 mg into the skin once a week.    Class 1 obesity with serious comorbidity and body mass index (BMI) of 30.0 to 30.9 in adult, unspecified obesity type  -     Tirzepatide-Weight Management (ZEPBOUND) 7.5 MG/0.5ML Subcutaneous Solution Auto-injector; Inject 7.5 mg into the skin once a week.    IFG (impaired fasting glucose)    Mixed hyperlipidemia    Insulin resistance    Anxiety        PLAN  Initial Weight: 195lbs  Initial Weight Date: 11/11/2019     Today's Weight: 174lbs  5% Goal: 9.75lbs  10% Goal: 19.5lbs  Total Weight Loss: + 5lb/Net loss 19lbs    Discontinue wegovy  Will begin Zepbound 7.5mg weekly - denies any personal or family history of pancreatitis, pancreatic cancer, thyroid cancer, MEN2 - discussed MOA, advised side effects and adverse effects of medication.  IFG/Insulin resistance - continue to work on low carb diet  HLD - stable on current medication atorvastatin, will continue, will continue to work on lifestyle modifications  Mood is stable on current medication, continue to work on stress management  Consistency with logging foods - protein and produce  Incorporate strength/resistance training  Nutrition: low carb diet/ recommended to eat breakfast daily/ regular protein intake  Medication use and side effects reviewed with patient.  Medication contraindications: phentermine - palpitations  Follow up with dietitian and psychologist as recommended.  Discussed the role of sleep and stress in weight management.  Counseled on comprehensive weight loss plan including attention to nutrition, exercise and behavior/stress management for success. See patient instruction below for more details.  Discussed strategies to overcome barriers to successful weight loss and weight maintenance  FITTE: ACSM recommendations (150-300  minutes/ week in active weight loss)   Total time spent on chart review, pre-charting, obtaining history, counseling, and educating, reviewing labs was 30 minutes.  Weight Loss consent to treat reviewed and signed       NOTE TO PATIENT: The 21st Century Cures Act makes clinical notes like these available to patients in the interest of transparency. Clinical notes are medical documents used by physicians and care providers to communicate with each other. These documents include medical language and terminology, abbreviations, and treatment information that may sound technical and at times possibly unfamiliar. In addition, at times, the verbiage may appear blunt or direct. These documents are one tool providers use to communicate relevant information and clinical opinions of the care providers in a way that allows common understanding of the clinical context.     There are no Patient Instructions on file for this visit.    No follow-ups on file.    Patient verbalizes understanding.    Trinidad Kennedy PA-C  3/21/2024

## 2024-03-22 ENCOUNTER — PATIENT MESSAGE (OUTPATIENT)
Dept: INTERNAL MEDICINE CLINIC | Facility: CLINIC | Age: 54
End: 2024-03-22

## 2024-03-22 DIAGNOSIS — E88.819 INSULIN RESISTANCE: ICD-10-CM

## 2024-03-22 DIAGNOSIS — E78.2 MIXED HYPERLIPIDEMIA: ICD-10-CM

## 2024-03-22 DIAGNOSIS — R73.01 IFG (IMPAIRED FASTING GLUCOSE): ICD-10-CM

## 2024-03-22 DIAGNOSIS — E66.9 CLASS 1 OBESITY WITH SERIOUS COMORBIDITY AND BODY MASS INDEX (BMI) OF 30.0 TO 30.9 IN ADULT, UNSPECIFIED OBESITY TYPE: ICD-10-CM

## 2024-03-22 DIAGNOSIS — Z51.81 ENCOUNTER FOR THERAPEUTIC DRUG MONITORING: Primary | ICD-10-CM

## 2024-03-25 NOTE — TELEPHONE ENCOUNTER
From: Cody Slade  To: Trinidad Kennedy  Sent: 3/22/2024 4:36 PM CDT  Subject: Zepbound    Hi Dr Kennedy. My insurance denied the prior authorization for Zepbound. Any ideas in what we can do next? Anyway I can get the 1.7 Wegovy to maybe kick start my body again? Thanks, Cody

## 2024-03-27 NOTE — TELEPHONE ENCOUNTER
How long has she been off Wegovy?  I don't want her to get sick.    I will send the 1.7mg dose, but if she has been off it, I would recommend that we titrate up

## 2024-03-28 ENCOUNTER — TELEPHONE (OUTPATIENT)
Dept: INTERNAL MEDICINE CLINIC | Facility: CLINIC | Age: 54
End: 2024-03-28

## 2024-03-28 NOTE — TELEPHONE ENCOUNTER
Prior authorization approved   Case ID: [id number]      Payer: [facility]    [phone]    [phone]   Your PA request has been approved.  Additional information will be provided in the approval communication. (Message 1143)   Approval Details    Authorized from March 28, 2024 to March 28, 2025        Will notify patient via [employer]

## 2024-04-15 NOTE — TELEPHONE ENCOUNTER
Patient would like [facility] to go through mail order  Patient Prior auth was approved after resubmit.  Transition from Wegovy 2.4mg

## 2024-04-16 RX ORDER — TIRZEPATIDE 7.5 MG/.5ML
7.5 INJECTION, SOLUTION SUBCUTANEOUS WEEKLY
Qty: 6 ML | Refills: 0 | Status: SHIPPED | OUTPATIENT
Start: 2024-04-16 | End: 2024-05-08

## 2024-06-20 DIAGNOSIS — E66.9 CLASS 1 OBESITY WITH SERIOUS COMORBIDITY AND BODY MASS INDEX (BMI) OF 30.0 TO 30.9 IN ADULT, UNSPECIFIED OBESITY TYPE: ICD-10-CM

## 2024-06-20 DIAGNOSIS — E88.819 INSULIN RESISTANCE: ICD-10-CM

## 2024-06-20 DIAGNOSIS — E78.2 MIXED HYPERLIPIDEMIA: ICD-10-CM

## 2024-06-20 DIAGNOSIS — Z51.81 ENCOUNTER FOR THERAPEUTIC DRUG MONITORING: ICD-10-CM

## 2024-06-20 DIAGNOSIS — R73.01 IFG (IMPAIRED FASTING GLUCOSE): ICD-10-CM

## 2024-06-20 RX ORDER — TIRZEPATIDE 7.5 MG/.5ML
7.5 INJECTION, SOLUTION SUBCUTANEOUS WEEKLY
Qty: 6 ML | Refills: 1 | Status: CANCELLED | OUTPATIENT
Start: 2024-06-20

## 2024-06-26 NOTE — TELEPHONE ENCOUNTER
When was her last dose of Wegovy?  And what is her local pharmacy that she would like it sent to?

## 2024-10-03 DIAGNOSIS — E78.2 MIXED DYSLIPIDEMIA: ICD-10-CM

## 2024-10-03 RX ORDER — ATORVASTATIN CALCIUM 10 MG/1
10 TABLET, FILM COATED ORAL DAILY
Qty: 90 TABLET | Refills: 0 | Status: SHIPPED | OUTPATIENT
Start: 2024-10-03

## 2025-01-02 DIAGNOSIS — E78.2 MIXED DYSLIPIDEMIA: ICD-10-CM

## 2025-01-03 RX ORDER — ATORVASTATIN CALCIUM 10 MG/1
10 TABLET, FILM COATED ORAL DAILY
Qty: 90 TABLET | Refills: 0 | OUTPATIENT
Start: 2025-01-03

## 2025-01-03 NOTE — TELEPHONE ENCOUNTER
Requesting Atorvastatin 10mg  LOV: 8/29/23 Physical  RTC: 1 year  Last Relevant Labs: 6/13/23  Filled: 10/3/24 #90 with 0 refills    No future appointments.    Cholesterol Medication Protocol [id number]/03/2025 01:36 PM   Protocol Details   ALT < 80    ALT resulted within past year    Lipid panel within past 12 months    In person appointment or virtual visit in the past 12 mos or appointment in next 3 mos     Patient needs appt.  Last OV notes state that patient was moving to [place] in summer 2024.

## (undated) DIAGNOSIS — F41.9 ANXIETY: ICD-10-CM

## (undated) DIAGNOSIS — F32.81 PMDD (PREMENSTRUAL DYSPHORIC DISORDER): ICD-10-CM

## (undated) DIAGNOSIS — Z51.81 ENCOUNTER FOR THERAPEUTIC DRUG MONITORING: ICD-10-CM

## (undated) DIAGNOSIS — E78.2 MIXED DYSLIPIDEMIA: ICD-10-CM

## (undated) NOTE — LETTER
04/21/21        Josue Marcial  [address] [address]      Dear Jaciel Kyle,    [facility] records indicate that you have outstanding lab work and or testing that was ordered for you and has not yet been completed:  Orders Placed This Encounte